# Patient Record
Sex: FEMALE | Race: WHITE | Employment: FULL TIME | URBAN - METROPOLITAN AREA
[De-identification: names, ages, dates, MRNs, and addresses within clinical notes are randomized per-mention and may not be internally consistent; named-entity substitution may affect disease eponyms.]

---

## 2017-12-27 ENCOUNTER — OFFICE VISIT (OUTPATIENT)
Dept: OBGYN | Age: 47
End: 2017-12-27

## 2017-12-27 VITALS
DIASTOLIC BLOOD PRESSURE: 70 MMHG | HEIGHT: 71 IN | SYSTOLIC BLOOD PRESSURE: 120 MMHG | BODY MASS INDEX: 38.58 KG/M2 | WEIGHT: 275.6 LBS

## 2017-12-27 DIAGNOSIS — Z12.39 BREAST CANCER SCREENING: ICD-10-CM

## 2017-12-27 DIAGNOSIS — Z01.419 ENCOUNTER FOR ANNUAL ROUTINE GYNECOLOGICAL EXAMINATION: Primary | ICD-10-CM

## 2017-12-27 LAB — PAP SMEAR: NORMAL

## 2017-12-27 PROCEDURE — 99396 PREV VISIT EST AGE 40-64: CPT | Performed by: OBSTETRICS & GYNECOLOGY

## 2017-12-27 RX ORDER — IBUPROFEN 600 MG/1
600 TABLET ORAL EVERY 6 HOURS PRN
Qty: 120 TABLET | Refills: 3 | Status: SHIPPED | OUTPATIENT
Start: 2017-12-27 | End: 2019-05-06 | Stop reason: SDUPTHER

## 2017-12-27 NOTE — PROGRESS NOTES
pending  MMG referral sent in   PT to monitor MMR sx and return to discuss treatment options as needed  Rx ibuprofen to pharmacy   Past medical, social and family history reviewed and updated in pt's chart. Routine health screenings and precautions discussed.

## 2018-01-05 DIAGNOSIS — Z01.419 ENCOUNTER FOR ANNUAL ROUTINE GYNECOLOGICAL EXAMINATION: ICD-10-CM

## 2018-01-06 ENCOUNTER — HOSPITAL ENCOUNTER (OUTPATIENT)
Dept: WOMENS IMAGING | Age: 48
Discharge: HOME | End: 2018-01-06
Payer: COMMERCIAL

## 2018-01-06 DIAGNOSIS — Z12.39 BREAST CANCER SCREENING: ICD-10-CM

## 2018-01-06 PROCEDURE — 77067 SCR MAMMO BI INCL CAD: CPT

## 2018-06-01 ENCOUNTER — OFFICE VISIT (OUTPATIENT)
Dept: PRIMARY CARE CLINIC | Age: 48
End: 2018-06-01
Payer: COMMERCIAL

## 2018-06-01 VITALS
SYSTOLIC BLOOD PRESSURE: 124 MMHG | BODY MASS INDEX: 40.18 KG/M2 | HEART RATE: 91 BPM | RESPIRATION RATE: 16 BRPM | WEIGHT: 287 LBS | TEMPERATURE: 98.4 F | DIASTOLIC BLOOD PRESSURE: 82 MMHG | OXYGEN SATURATION: 98 % | HEIGHT: 71 IN

## 2018-06-01 DIAGNOSIS — M54.2 NECK PAIN: Primary | ICD-10-CM

## 2018-06-01 PROCEDURE — 96372 THER/PROPH/DIAG INJ SC/IM: CPT | Performed by: NURSE PRACTITIONER

## 2018-06-01 PROCEDURE — 99213 OFFICE O/P EST LOW 20 MIN: CPT | Performed by: NURSE PRACTITIONER

## 2018-06-01 RX ORDER — PREDNISONE 20 MG/1
TABLET ORAL
COMMUNITY
Start: 2018-05-27 | End: 2018-10-08

## 2018-06-01 RX ORDER — KETOROLAC TROMETHAMINE 30 MG/ML
60 INJECTION, SOLUTION INTRAMUSCULAR; INTRAVENOUS ONCE
Status: COMPLETED | OUTPATIENT
Start: 2018-06-01 | End: 2018-06-01

## 2018-06-01 RX ORDER — CYCLOBENZAPRINE HCL 10 MG
10 TABLET ORAL 3 TIMES DAILY PRN
Qty: 9 TABLET | Refills: 0 | Status: SHIPPED | OUTPATIENT
Start: 2018-06-01 | End: 2018-06-04

## 2018-06-01 RX ORDER — CYCLOBENZAPRINE HCL 10 MG
TABLET ORAL
COMMUNITY
Start: 2018-05-27 | End: 2018-06-01 | Stop reason: SDUPTHER

## 2018-06-01 RX ORDER — PANTOPRAZOLE SODIUM 40 MG/1
TABLET, DELAYED RELEASE ORAL
Refills: 3 | COMMUNITY
Start: 2018-04-12 | End: 2018-10-08

## 2018-06-01 RX ADMIN — KETOROLAC TROMETHAMINE 60 MG: 30 INJECTION, SOLUTION INTRAMUSCULAR; INTRAVENOUS at 15:09

## 2018-06-01 ASSESSMENT — ENCOUNTER SYMPTOMS
COLOR CHANGE: 0
TROUBLE SWALLOWING: 0
SHORTNESS OF BREATH: 0

## 2018-06-04 ENCOUNTER — OFFICE VISIT (OUTPATIENT)
Dept: PRIMARY CARE CLINIC | Age: 48
End: 2018-06-04
Payer: COMMERCIAL

## 2018-06-04 VITALS
RESPIRATION RATE: 16 BRPM | BODY MASS INDEX: 39.76 KG/M2 | HEART RATE: 86 BPM | HEIGHT: 71 IN | OXYGEN SATURATION: 99 % | SYSTOLIC BLOOD PRESSURE: 134 MMHG | TEMPERATURE: 97.6 F | WEIGHT: 284 LBS | DIASTOLIC BLOOD PRESSURE: 90 MMHG

## 2018-06-04 DIAGNOSIS — I10 ESSENTIAL HYPERTENSION: ICD-10-CM

## 2018-06-04 DIAGNOSIS — M54.2 NECK PAIN ON RIGHT SIDE: Primary | ICD-10-CM

## 2018-06-04 PROCEDURE — G8417 CALC BMI ABV UP PARAM F/U: HCPCS | Performed by: INTERNAL MEDICINE

## 2018-06-04 PROCEDURE — 99214 OFFICE O/P EST MOD 30 MIN: CPT | Performed by: INTERNAL MEDICINE

## 2018-06-04 PROCEDURE — 1036F TOBACCO NON-USER: CPT | Performed by: INTERNAL MEDICINE

## 2018-06-04 PROCEDURE — G8427 DOCREV CUR MEDS BY ELIG CLIN: HCPCS | Performed by: INTERNAL MEDICINE

## 2018-06-04 RX ORDER — LOSARTAN POTASSIUM 50 MG/1
50 TABLET ORAL DAILY
Qty: 30 TABLET | Refills: 5 | Status: SHIPPED | OUTPATIENT
Start: 2018-06-04 | End: 2018-10-02 | Stop reason: SDUPTHER

## 2018-06-04 RX ORDER — CYCLOBENZAPRINE HCL 10 MG
10 TABLET ORAL 2 TIMES DAILY PRN
Qty: 60 TABLET | Refills: 2 | Status: SHIPPED | OUTPATIENT
Start: 2018-06-04 | End: 2018-10-08

## 2018-06-04 ASSESSMENT — PATIENT HEALTH QUESTIONNAIRE - PHQ9
2. FEELING DOWN, DEPRESSED OR HOPELESS: 0
SUM OF ALL RESPONSES TO PHQ QUESTIONS 1-9: 0
SUM OF ALL RESPONSES TO PHQ9 QUESTIONS 1 & 2: 0
1. LITTLE INTEREST OR PLEASURE IN DOING THINGS: 0

## 2018-06-07 ASSESSMENT — ENCOUNTER SYMPTOMS
CHOKING: 0
APNEA: 0
PHOTOPHOBIA: 0
BLOOD IN STOOL: 0
FACIAL SWELLING: 0
ABDOMINAL DISTENTION: 0

## 2018-09-28 DIAGNOSIS — T78.40XA ALLERGIC STATE, INITIAL ENCOUNTER: Primary | ICD-10-CM

## 2018-10-02 DIAGNOSIS — I10 ESSENTIAL HYPERTENSION: ICD-10-CM

## 2018-10-02 RX ORDER — LOSARTAN POTASSIUM 100 MG/1
100 TABLET ORAL DAILY
Qty: 90 TABLET | Refills: 0 | Status: SHIPPED | OUTPATIENT
Start: 2018-10-02 | End: 2018-10-03 | Stop reason: SDUPTHER

## 2018-10-03 DIAGNOSIS — I10 ESSENTIAL HYPERTENSION: ICD-10-CM

## 2018-10-03 RX ORDER — LOSARTAN POTASSIUM 100 MG/1
100 TABLET ORAL DAILY
Qty: 90 TABLET | Refills: 1 | Status: SHIPPED | OUTPATIENT
Start: 2018-10-03 | End: 2018-10-08

## 2018-10-08 ENCOUNTER — OFFICE VISIT (OUTPATIENT)
Dept: OBGYN CLINIC | Age: 48
End: 2018-10-08
Payer: COMMERCIAL

## 2018-10-08 VITALS
DIASTOLIC BLOOD PRESSURE: 92 MMHG | HEIGHT: 71 IN | BODY MASS INDEX: 39.06 KG/M2 | SYSTOLIC BLOOD PRESSURE: 138 MMHG | WEIGHT: 279 LBS

## 2018-10-08 DIAGNOSIS — Z01.419 WELL WOMAN EXAM WITH ROUTINE GYNECOLOGICAL EXAM: Primary | ICD-10-CM

## 2018-10-08 DIAGNOSIS — Z11.51 SCREENING FOR HPV (HUMAN PAPILLOMAVIRUS): ICD-10-CM

## 2018-10-08 DIAGNOSIS — Z12.39 BREAST CANCER SCREENING: ICD-10-CM

## 2018-10-08 PROCEDURE — G8484 FLU IMMUNIZE NO ADMIN: HCPCS | Performed by: OBSTETRICS & GYNECOLOGY

## 2018-10-08 PROCEDURE — 99396 PREV VISIT EST AGE 40-64: CPT | Performed by: OBSTETRICS & GYNECOLOGY

## 2018-10-08 RX ORDER — LEVOTHYROXINE SODIUM 137 MCG
TABLET ORAL
COMMUNITY
Start: 2018-08-03

## 2018-10-08 RX ORDER — PANTOPRAZOLE SODIUM 20 MG/1
40 TABLET, DELAYED RELEASE ORAL
COMMUNITY
Start: 2018-08-27 | End: 2022-10-26

## 2018-10-08 RX ORDER — LOSARTAN POTASSIUM 50 MG/1
TABLET ORAL
COMMUNITY
Start: 2018-09-07 | End: 2020-07-09

## 2018-10-08 NOTE — PROGRESS NOTES
SUBJECTIVE:   51 y.o.  female here for annual exam. Pt with regular menses but heavy menses and no complaints today. PT with some intermittent hot flushes. Pt's  with vasectomy. Review of Systems:  General ROS: negative  Psychological ROS: negative  ENT ROS: negative  Endocrine ROS: negative  Respiratory ROS: no cough, shortness of breath, or wheezing  Cardiovascular ROS: no chest pain or dyspnea on exertion  Gastrointestinal ROS: no abdominal pain, change in bowel habits, or black or bloody stools  Genito-Urinary ROS: no dysuria, trouble voiding, or hematuria  Musculoskeletal ROS: negative  Neurological ROS: no TIA or stroke symptoms  Dermatological ROS: negative    OBJECTIVE:   BP (!) 138/92   Ht 5' 11\" (1.803 m)   Wt 279 lb (126.6 kg)   LMP 2018   BMI 38.91 kg/m²     Physical Exam:  CONSTITUTIONAL: She appears well nourished and developed  NEUROLOGIC: Alert and oriented to time, place and person  NECK: no thyroidmegaly  BREASTS: Bilateral breasts without masses, lesions or nipple discharge  LUNGS: Clear to ascultation bilaterally  CVS: regular rate and rhythm  LYMPHATIC: No palpable lymph nodes  ABDOMEN: benign, soft, nontender, no masses. No liver or splenic organomegaly. No evidence of abdominal or inguinal hernia. No indication for occult blood testing  SKIN: normal texture and tone, no lesions  NEURO: normal tone, no hyperreflexia, 1+DTRs throughout    Pelvic Exam:   EFG: normal external genitalia  URETHRAL MEATUS: normal size, no diverticula   URETHRA: normal appearing without diverticula or lesions  BLADDER:  No masses or tenderness  VAGINA: normal rugae, no discharge   CERVIX: nulliparous, no lesions  UTERUS: uterus is normal size, shape, consistency and nontender   ADNEXA: normal adnexa in size, nontender and no masses. PERINEUM: normal appearing without lesions or masses  RECTUM: no hemorrhoids or rectal masses.      ASSESSMENT:   Routine gynecologic exam  Breast cancer

## 2018-10-12 DIAGNOSIS — Z11.51 SCREENING FOR HPV (HUMAN PAPILLOMAVIRUS): ICD-10-CM

## 2018-10-12 DIAGNOSIS — Z01.419 WELL WOMAN EXAM WITH ROUTINE GYNECOLOGICAL EXAM: ICD-10-CM

## 2018-12-26 ENCOUNTER — HOSPITAL ENCOUNTER (OUTPATIENT)
Dept: GENERAL RADIOLOGY | Age: 48
Discharge: HOME | End: 2018-12-28
Payer: COMMERCIAL

## 2018-12-26 DIAGNOSIS — S43.431A SUPERIOR GLENOID LABRUM LESION OF RIGHT SHOULDER, INITIAL ENCOUNTER: ICD-10-CM

## 2018-12-26 PROCEDURE — 73020 X-RAY EXAM OF SHOULDER: CPT

## 2019-03-14 ENCOUNTER — HOSPITAL ENCOUNTER (OUTPATIENT)
Dept: WOMENS IMAGING | Age: 49
Discharge: HOME | End: 2019-03-16
Payer: COMMERCIAL

## 2019-03-14 DIAGNOSIS — Z01.419 WELL WOMAN EXAM WITH ROUTINE GYNECOLOGICAL EXAM: ICD-10-CM

## 2019-03-14 DIAGNOSIS — Z12.39 BREAST CANCER SCREENING: ICD-10-CM

## 2019-03-14 PROCEDURE — 77067 SCR MAMMO BI INCL CAD: CPT

## 2019-04-04 DIAGNOSIS — I10 ESSENTIAL HYPERTENSION: ICD-10-CM

## 2019-04-08 RX ORDER — LOSARTAN POTASSIUM 100 MG/1
100 TABLET ORAL DAILY
Qty: 90 TABLET | Refills: 0 | Status: SHIPPED | OUTPATIENT
Start: 2019-04-08 | End: 2019-04-11 | Stop reason: SDUPTHER

## 2019-04-08 NOTE — TELEPHONE ENCOUNTER
Patient was last seen on 6-4-18  Last Prescribed 9-7-18    Medication is pending  Please approve or deny this request

## 2019-04-11 DIAGNOSIS — I10 ESSENTIAL HYPERTENSION: ICD-10-CM

## 2019-04-24 RX ORDER — LOSARTAN POTASSIUM 100 MG/1
100 TABLET ORAL DAILY
Qty: 90 TABLET | Refills: 0 | Status: SHIPPED | OUTPATIENT
Start: 2019-04-24 | End: 2021-05-16 | Stop reason: SINTOL

## 2019-05-04 ENCOUNTER — HOSPITAL ENCOUNTER (OUTPATIENT)
Dept: GENERAL RADIOLOGY | Age: 49
Discharge: HOME | End: 2019-05-06
Payer: COMMERCIAL

## 2019-05-04 ENCOUNTER — OFFICE VISIT (OUTPATIENT)
Dept: FAMILY MEDICINE CLINIC | Age: 49
End: 2019-05-04
Payer: COMMERCIAL

## 2019-05-04 VITALS
SYSTOLIC BLOOD PRESSURE: 130 MMHG | HEART RATE: 86 BPM | BODY MASS INDEX: 39.62 KG/M2 | DIASTOLIC BLOOD PRESSURE: 84 MMHG | HEIGHT: 71 IN | WEIGHT: 283 LBS | TEMPERATURE: 97.1 F | RESPIRATION RATE: 15 BRPM | OXYGEN SATURATION: 100 %

## 2019-05-04 DIAGNOSIS — M25.552 LATERAL PAIN OF LEFT HIP: Primary | ICD-10-CM

## 2019-05-04 DIAGNOSIS — M25.552 LATERAL PAIN OF LEFT HIP: ICD-10-CM

## 2019-05-04 PROCEDURE — 1036F TOBACCO NON-USER: CPT | Performed by: NURSE PRACTITIONER

## 2019-05-04 PROCEDURE — 73502 X-RAY EXAM HIP UNI 2-3 VIEWS: CPT

## 2019-05-04 PROCEDURE — 99213 OFFICE O/P EST LOW 20 MIN: CPT | Performed by: NURSE PRACTITIONER

## 2019-05-04 PROCEDURE — G8417 CALC BMI ABV UP PARAM F/U: HCPCS | Performed by: NURSE PRACTITIONER

## 2019-05-04 PROCEDURE — G8427 DOCREV CUR MEDS BY ELIG CLIN: HCPCS | Performed by: NURSE PRACTITIONER

## 2019-05-04 ASSESSMENT — ENCOUNTER SYMPTOMS
RHINORRHEA: 0
WHEEZING: 0
VOMITING: 0
COUGH: 0
DIARRHEA: 0
SORE THROAT: 0
SHORTNESS OF BREATH: 0
ABDOMINAL PAIN: 0
NAUSEA: 0

## 2019-05-04 ASSESSMENT — PATIENT HEALTH QUESTIONNAIRE - PHQ9
2. FEELING DOWN, DEPRESSED OR HOPELESS: 0
1. LITTLE INTEREST OR PLEASURE IN DOING THINGS: 0
SUM OF ALL RESPONSES TO PHQ QUESTIONS 1-9: 0
SUM OF ALL RESPONSES TO PHQ QUESTIONS 1-9: 0
SUM OF ALL RESPONSES TO PHQ9 QUESTIONS 1 & 2: 0

## 2019-05-04 NOTE — PROGRESS NOTES
Subjective  Laisha Knutson, 51 y.o. female presents today with:  Chief Complaint   Patient presents with    Hip Pain     left hip pain ,[id number], dull ache but gets sharp       Hip Pain    Incident onset: x1 month ago. The incident occurred at home. The pain is present in the left hip. The quality of the pain is described as stabbing and aching. The pain is at a severity of 8/10. The pain is severe. The pain has been constant since onset. Pertinent negatives include no numbness or tingling. She reports no foreign bodies present. The symptoms are aggravated by weight bearing. She has tried NSAIDs for the symptoms. The treatment provided mild relief. Review of Systems   Constitutional: Positive for activity change. Negative for chills, fatigue and fever. HENT: Negative for congestion, rhinorrhea and sore throat. Respiratory: Negative for cough, shortness of breath and wheezing. Gastrointestinal: Negative for abdominal pain, diarrhea, nausea and vomiting. Musculoskeletal: Positive for arthralgias (left hip) and gait problem. Negative for myalgias and neck pain. Neurological: Negative for dizziness, tingling, weakness, light-headedness, numbness and headaches.        Past Medical History:   Diagnosis Date    Hypothyroidism     Sleep apnea 03/2016    dr Perri Velasco, mild, c pap    Unspecified vitamin D deficiency      Past Surgical History:   Procedure Laterality Date    APPENDECTOMY      CARPAL TUNNEL RELEASE      CHOLECYSTECTOMY      DILATION AND CURETTAGE OF UTERUS       Social History     Socioeconomic History    Marital status:      Spouse name: Not on file    Number of children: Not on file    Years of education: Not on file    Highest education level: Not on file   Occupational History    Not on file   Social Needs    Financial resource strain: Not on file    Food insecurity:     Worry: Not on file     Inability: Not on file    Transportation needs:     Medical: Not on file     Non-medical: Not on file   Tobacco Use    Smoking status: Never Smoker    Smokeless tobacco: Never Used   Substance and Sexual Activity    Alcohol use: No     Comment: occasional/rare    Drug use: No    Sexual activity: Yes   Lifestyle    Physical activity:     Days per week: Not on file     Minutes per session: Not on file    Stress: Not on file   Relationships    Social connections:     Talks on phone: Not on file     Gets together: Not on file     Attends Taoist service: Not on file     Active member of club or organization: Not on file     Attends meetings of clubs or organizations: Not on file     Relationship status: Not on file    Intimate partner violence:     Fear of current or ex partner: Not on file     Emotionally abused: Not on file     Physically abused: Not on file     Forced sexual activity: Not on file   Other Topics Concern    Not on file   Social History Narrative    Not on file     No family history on file. Allergies   Allergen Reactions    Penicillin G Anaphylaxis    Codeine Other (See Comments)    Demerol     Meperidine Other (See Comments)     vomitting    Penicillins Other (See Comments)     Current Outpatient Medications   Medication Sig Dispense Refill    losartan (COZAAR) 100 MG tablet Take 1 tablet by mouth daily 90 tablet 0    SYNTHROID 137 MCG tablet       ibuprofen (ADVIL;MOTRIN) 600 MG tablet Take 1 tablet by mouth every 6 hours as needed for Pain 120 tablet 3    pantoprazole (PROTONIX) 20 MG tablet Take 40 mg by mouth      losartan (COZAAR) 50 MG tablet       vitamin D (ERGOCALCIFEROL) 94077 UNITS CAPS capsule Take 50,000 Units by mouth every 14 days. No current facility-administered medications for this visit. PMH, Surgical Hx, Family Hx, and Social Hx reviewed and updated. Health Maintenance reviewed.     Objective  Vitals:    05/04/19 1135   BP: 130/84   Site: Left Upper Arm   Position: Sitting   Cuff Size: Large Adult   Pulse: 86 Resp: 15   Temp: 97.1 °F (36.2 °C)   TempSrc: Temporal   SpO2: 100%   Weight: 283 lb (128.4 kg)   Height: 5' 11\" (1.803 m)     BP Readings from Last 3 Encounters:   05/04/19 130/84   10/08/18 (!) 138/92   06/04/18 (!) 134/90     Wt Readings from Last 3 Encounters:   05/04/19 283 lb (128.4 kg)   10/08/18 279 lb (126.6 kg)   06/04/18 284 lb (128.8 kg)     Physical Exam   Constitutional: She is oriented to person, place, and time. She appears well-developed and well-nourished. She is active. Eyes: Pupils are equal, round, and reactive to light. Conjunctivae and EOM are normal.   Neck: Full passive range of motion without pain. No muscular tenderness present. Cardiovascular: Normal rate, regular rhythm, S1 normal, S2 normal, normal heart sounds and intact distal pulses. Pulmonary/Chest: Effort normal and breath sounds normal. No respiratory distress. She has no wheezes. She has no rhonchi. She has no rales. Musculoskeletal:        Legs:  Neurological: She is alert and oriented to person, place, and time. She has normal strength and normal reflexes. Skin: Skin is warm, dry and intact. No rash noted. Psychiatric: She has a normal mood and affect. Her speech is normal and behavior is normal.     Assessment & Plan    Diagnosis Orders   1. Lateral pain of left hip  XR HIP LEFT (2-3 VIEWS)     Orders Placed This Encounter   Procedures    XR HIP LEFT (2-3 VIEWS)     Standing Status:   Future     Number of Occurrences:   1     Standing Expiration Date:   5/3/2020     No orders of the defined types were placed in this encounter. There are no discontinued medications. Return in about 2 weeks (around 5/18/2019) for follow up with PCP. Reviewed with the patient: current clinical status,medications, activities and diet.      Motrin as needed for pain  Follow up with PCP for further management    Side effects, adverse effects of themedication prescribed today, as well as treatment plan/ rationale and result

## 2019-05-06 NOTE — TELEPHONE ENCOUNTER
Sonu Talmaantes requesting refill Please approve or deny refill request. Medication pended. Last OV: 10/2/18  Last RX: 12/27/1    IS THIS A CONTROLLED MEDICATION: No      [facility] [address] [phone] - F [address] [address]  [facility] [place] [address]  Phone: [phone] Fax: [phone]    [facility] [address] [phone] [facility] [phone]  [address] [place] [id number]  Phone: [phone] Fax: [phone]      Patient was advised to check with their pharmacy within 24-48 hours:  no    Next Visit Date:  No future appointments.     Requested Prescriptions     Pending Prescriptions Disp Refills    ibuprofen (ADVIL;MOTRIN) 600 MG tablet [Pharmacy Med Name: IBUPROFEN  600MG  TAB] 360 tablet      Sig: TAKE 1 TABLET BY MOUTH  EVERY 6 HOURS AS NEEDED FOR PAIN

## 2019-05-07 RX ORDER — IBUPROFEN 600 MG/1
600 TABLET ORAL EVERY 6 HOURS PRN
Qty: 360 TABLET | Refills: 2 | Status: SHIPPED | OUTPATIENT
Start: 2019-05-07 | End: 2020-07-09 | Stop reason: SDUPTHER

## 2019-07-10 RX ORDER — LOSARTAN POTASSIUM AND HYDROCHLOROTHIAZIDE 25; 100 MG/1; MG/1
1 TABLET ORAL DAILY
Qty: 90 TABLET | Refills: 0 | Status: SHIPPED | OUTPATIENT
Start: 2019-07-10 | End: 2019-09-26 | Stop reason: SDUPTHER

## 2019-09-26 DIAGNOSIS — I10 ESSENTIAL HYPERTENSION: ICD-10-CM

## 2019-09-26 RX ORDER — LOSARTAN POTASSIUM AND HYDROCHLOROTHIAZIDE 25; 100 MG/1; MG/1
1 TABLET ORAL DAILY
Qty: 30 TABLET | Refills: 0 | Status: SHIPPED | OUTPATIENT
Start: 2019-09-26 | End: 2020-07-09

## 2019-10-21 ENCOUNTER — HOSPITAL ENCOUNTER (OUTPATIENT)
Dept: ORTHOPEDIC SURGERY | Age: 49
Discharge: HOME | End: 2019-10-23
Payer: COMMERCIAL

## 2019-10-21 DIAGNOSIS — M18.9 OSTEOARTHRITIS OF CARPOMETACARPAL JOINT OF THUMB, UNSPECIFIED LATERALITY, UNSPECIFIED OSTEOARTHRITIS TYPE: ICD-10-CM

## 2019-10-21 PROCEDURE — 73140 X-RAY EXAM OF FINGER(S): CPT

## 2020-07-09 ENCOUNTER — HOSPITAL ENCOUNTER (OUTPATIENT)
Dept: WOMENS IMAGING | Age: 50
Discharge: HOME | End: 2020-07-11
Payer: COMMERCIAL

## 2020-07-09 ENCOUNTER — OFFICE VISIT (OUTPATIENT)
Dept: OBGYN CLINIC | Age: 50
End: 2020-07-09
Payer: COMMERCIAL

## 2020-07-09 VITALS
DIASTOLIC BLOOD PRESSURE: 82 MMHG | HEIGHT: 70 IN | BODY MASS INDEX: 39.51 KG/M2 | WEIGHT: 276 LBS | SYSTOLIC BLOOD PRESSURE: 114 MMHG

## 2020-07-09 PROCEDURE — 99396 PREV VISIT EST AGE 40-64: CPT | Performed by: OBSTETRICS & GYNECOLOGY

## 2020-07-09 PROCEDURE — 77063 BREAST TOMOSYNTHESIS BI: CPT

## 2020-07-09 RX ORDER — HYDROCHLOROTHIAZIDE 25 MG/1
25 TABLET ORAL DAILY
COMMUNITY
Start: 2020-01-27 | End: 2021-04-13 | Stop reason: SDUPTHER

## 2020-07-09 RX ORDER — AMLODIPINE BESYLATE 5 MG/1
5 TABLET ORAL DAILY
COMMUNITY
Start: 2019-12-12 | End: 2021-12-11 | Stop reason: SDUPTHER

## 2020-07-09 RX ORDER — PSYLLIUM HUSK 0.4 G
CAPSULE ORAL
COMMUNITY
Start: 2019-07-01 | End: 2021-04-13

## 2020-07-09 RX ORDER — IBUPROFEN 600 MG/1
600 TABLET ORAL EVERY 6 HOURS PRN
Qty: 60 TABLET | Refills: 4 | Status: SHIPPED | OUTPATIENT
Start: 2020-07-09 | End: 2021-05-11 | Stop reason: ALTCHOICE

## 2020-07-09 ASSESSMENT — PATIENT HEALTH QUESTIONNAIRE - PHQ9
SUM OF ALL RESPONSES TO PHQ QUESTIONS 1-9: 0
2. FEELING DOWN, DEPRESSED OR HOPELESS: 0
SUM OF ALL RESPONSES TO PHQ9 QUESTIONS 1 & 2: 0
1. LITTLE INTEREST OR PLEASURE IN DOING THINGS: 0
SUM OF ALL RESPONSES TO PHQ QUESTIONS 1-9: 0

## 2020-07-09 NOTE — PROGRESS NOTES
SUBJECTIVE:   47 y.o.  female here for annual exam. Pt with regular menses and no complaints today. Pt's partner with vasectomy. Review of Systems:  General ROS: negative  Psychological ROS: negative  ENT ROS: negative  Endocrine ROS: negative  Respiratory ROS: no cough, shortness of breath, or wheezing  Cardiovascular ROS: no chest pain or dyspnea on exertion  Gastrointestinal ROS: no abdominal pain, change in bowel habits, or black or bloody stools  Genito-Urinary ROS: no dysuria, trouble voiding, or hematuria  Musculoskeletal ROS: negative  Neurological ROS: no TIA or stroke symptoms  Dermatological ROS: negative    OBJECTIVE:   Ht 5' 10\" (1.778 m)   Wt 276 lb (125.2 kg)   LMP 2020   BMI 39.60 kg/m²     Physical Exam:  CONSTITUTIONAL: She appears well nourished and developed   NEUROLOGIC: Alert and oriented to time, place and person  NECK: no thyroidmegaly  BREASTS: Bilateral breasts without masses, lesions or nipple discharge  LUNGS: Clear to ascultation bilaterally  CVS: regular rate and rhythm  LYMPHATIC: No palpable lymph nodes  ABDOMEN: benign, soft, nontender, no masses. No liver or splenic organomegaly. No evidence of abdominal or inguinal hernia. No indication for occult blood testing  SKIN: normal texture and tone, no lesions  NEURO: normal tone, no hyperreflexia, 1+DTRs throughout    Pelvic Exam:   EFG: normal external genitalia  URETHRAL MEATUS: normal size, no diverticula   URETHRA: normal appearing without diverticula or lesions  BLADDER:  No masses or tenderness  VAGINA: normal rugae, no discharge   CERVIX: parous, no lesions  UTERUS: uterus is normal size, shape, consistency and nontender   ADNEXA: normal adnexa in size, nontender and no masses. PERINEUM: normal appearing without lesions or masses  RECTUM: no hemorrhoids or rectal masses.      ASSESSMENT:   Routine gynecologic exam  Breast cancer screening    PLAN:   Pap with hpv pending  MMG referral sent in   Past medical, social and family history reviewed and updated in pt's chart. Routine health screenings and precautions discussed.

## 2021-04-13 ENCOUNTER — OFFICE VISIT (OUTPATIENT)
Dept: PRIMARY CARE CLINIC | Age: 51
End: 2021-04-13
Payer: COMMERCIAL

## 2021-04-13 VITALS
OXYGEN SATURATION: 99 % | HEART RATE: 73 BPM | HEIGHT: 71 IN | SYSTOLIC BLOOD PRESSURE: 122 MMHG | WEIGHT: 260.4 LBS | RESPIRATION RATE: 18 BRPM | BODY MASS INDEX: 36.46 KG/M2 | DIASTOLIC BLOOD PRESSURE: 86 MMHG

## 2021-04-13 DIAGNOSIS — Z00.00 PREVENTATIVE HEALTH CARE: ICD-10-CM

## 2021-04-13 DIAGNOSIS — M25.561 ANTERIOR KNEE PAIN, RIGHT: Primary | ICD-10-CM

## 2021-04-13 DIAGNOSIS — I15.9 SECONDARY HYPERTENSION: ICD-10-CM

## 2021-04-13 LAB
ALBUMIN SERPL-MCNC: 3.7 G/DL (ref 3.5–4.6)
ALP BLD-CCNC: 58 U/L (ref 40–130)
ALT SERPL-CCNC: 15 U/L (ref 0–33)
ANION GAP SERPL CALCULATED.3IONS-SCNC: 11 MEQ/L (ref 9–15)
AST SERPL-CCNC: 13 U/L (ref 0–35)
BASOPHILS ABSOLUTE: 0.1 K/UL (ref 0–0.2)
BASOPHILS RELATIVE PERCENT: 0.9 %
BILIRUB SERPL-MCNC: 0.8 MG/DL (ref 0.2–0.7)
BUN BLDV-MCNC: 10 MG/DL (ref 6–20)
CALCIUM SERPL-MCNC: 8.9 MG/DL (ref 8.5–9.9)
CHLORIDE BLD-SCNC: 103 MEQ/L (ref 95–107)
CHOLESTEROL, TOTAL: 161 MG/DL (ref 0–199)
CO2: 24 MEQ/L (ref 20–31)
CREAT SERPL-MCNC: 0.92 MG/DL (ref 0.5–0.9)
EOSINOPHILS ABSOLUTE: 0.2 K/UL (ref 0–0.7)
EOSINOPHILS RELATIVE PERCENT: 2.1 %
GFR AFRICAN AMERICAN: >60
GFR NON-AFRICAN AMERICAN: >60
GLOBULIN: 2.9 G/DL (ref 2.3–3.5)
GLUCOSE BLD-MCNC: 97 MG/DL (ref 70–99)
HBA1C MFR BLD: 5.4 % (ref 4.8–5.9)
HCT VFR BLD CALC: 40.1 % (ref 37–47)
HDLC SERPL-MCNC: 45 MG/DL (ref 40–59)
HEMOGLOBIN: 13.3 G/DL (ref 12–16)
HEPATITIS C ANTIBODY INTERPRETATION: NORMAL
LDL CHOLESTEROL CALCULATED: 99 MG/DL (ref 0–129)
LYMPHOCYTES ABSOLUTE: 2.5 K/UL (ref 1–4.8)
LYMPHOCYTES RELATIVE PERCENT: 28.9 %
MCH RBC QN AUTO: 28.2 PG (ref 27–31.3)
MCHC RBC AUTO-ENTMCNC: 33.3 % (ref 33–37)
MCV RBC AUTO: 84.9 FL (ref 82–100)
MONOCYTES ABSOLUTE: 0.5 K/UL (ref 0.2–0.8)
MONOCYTES RELATIVE PERCENT: 6.2 %
NEUTROPHILS ABSOLUTE: 5.3 K/UL (ref 1.4–6.5)
NEUTROPHILS RELATIVE PERCENT: 61.9 %
PDW BLD-RTO: 14.5 % (ref 11.5–14.5)
PLATELET # BLD: 299 K/UL (ref 130–400)
POTASSIUM SERPL-SCNC: 3.8 MEQ/L (ref 3.4–4.9)
RBC # BLD: 4.72 M/UL (ref 4.2–5.4)
SODIUM BLD-SCNC: 138 MEQ/L (ref 135–144)
TOTAL PROTEIN: 6.6 G/DL (ref 6.3–8)
TRIGL SERPL-MCNC: 87 MG/DL (ref 0–150)
TSH REFLEX: 1.28 UIU/ML (ref 0.44–3.86)
VITAMIN D 25-HYDROXY: 34.6 NG/ML (ref 30–100)
WBC # BLD: 8.5 K/UL (ref 4.8–10.8)

## 2021-04-13 PROCEDURE — 3017F COLORECTAL CA SCREEN DOC REV: CPT | Performed by: INTERNAL MEDICINE

## 2021-04-13 PROCEDURE — G8427 DOCREV CUR MEDS BY ELIG CLIN: HCPCS | Performed by: INTERNAL MEDICINE

## 2021-04-13 PROCEDURE — G8417 CALC BMI ABV UP PARAM F/U: HCPCS | Performed by: INTERNAL MEDICINE

## 2021-04-13 PROCEDURE — 1036F TOBACCO NON-USER: CPT | Performed by: INTERNAL MEDICINE

## 2021-04-13 PROCEDURE — 99214 OFFICE O/P EST MOD 30 MIN: CPT | Performed by: INTERNAL MEDICINE

## 2021-04-13 RX ORDER — HYDROCHLOROTHIAZIDE 25 MG/1
12.5 TABLET ORAL DAILY
Qty: 45 TABLET | Refills: 3 | Status: SHIPPED | OUTPATIENT
Start: 2021-04-13 | End: 2021-05-16 | Stop reason: SINTOL

## 2021-04-13 SDOH — ECONOMIC STABILITY: TRANSPORTATION INSECURITY
IN THE PAST 12 MONTHS, HAS LACK OF TRANSPORTATION KEPT YOU FROM MEETINGS, WORK, OR FROM GETTING THINGS NEEDED FOR DAILY LIVING?: NO

## 2021-04-13 SDOH — ECONOMIC STABILITY: INCOME INSECURITY: HOW HARD IS IT FOR YOU TO PAY FOR THE VERY BASICS LIKE FOOD, HOUSING, MEDICAL CARE, AND HEATING?: NOT VERY HARD

## 2021-04-13 ASSESSMENT — ENCOUNTER SYMPTOMS
DIARRHEA: 0
COUGH: 0
VOMITING: 0
NAUSEA: 0
ABDOMINAL PAIN: 0
SHORTNESS OF BREATH: 0
WHEEZING: 0

## 2021-04-13 ASSESSMENT — PATIENT HEALTH QUESTIONNAIRE - PHQ9
1. LITTLE INTEREST OR PLEASURE IN DOING THINGS: 0
2. FEELING DOWN, DEPRESSED OR HOPELESS: 0

## 2021-04-13 NOTE — PROGRESS NOTES
Subjective:      Patient ID: Scarlett Reese is a 45 y.o. female    New to provider   HPI  Pt is new to provider. Wants to switch from Dr. Fern Mahajan. Hx hypertension, INNA, hypothyroidism   Meds: losartan, amlodipine, HCTZ, lopressor, Synthroid. Last colonoscopy: hemorrhoid and divertoculosis in 2018. Repeat in 2025  Last pap: negative result in 2020    Last mammogram: negative in 2020  ASCVD score: 1.6%    CC: right knee pain x 3 weeks   Pain is in front of the right knee, sharp, rated 6/10, radiating down the leg, relieved with ibuprofen, worse with climbing stairs. Assoc swelling, no fever or chills. Pain started after she tried getting out of bed ans heard a \"pop\". No leg weakness, buckling or locking. Pain is improvng since.     Past Medical History:   Diagnosis Date    Hypothyroidism     Secondary hypertension 4/13/2021    Sleep apnea 03/2016    dr Adlai Avendano, mild, c pap    Unspecified vitamin D deficiency      Past Surgical History:   Procedure Laterality Date    APPENDECTOMY      CARPAL TUNNEL RELEASE      CHOLECYSTECTOMY      DILATION AND CURETTAGE OF UTERUS       Social History     Socioeconomic History    Marital status:      Spouse name: Not on file    Number of children: Not on file    Years of education: Not on file    Highest education level: Not on file   Occupational History    Not on file   Social Needs    Financial resource strain: Not very hard    Food insecurity     Worry: Never true     Inability: Never true    Transportation needs     Medical: No     Non-medical: No   Tobacco Use    Smoking status: Never Smoker    Smokeless tobacco: Never Used   Substance and Sexual Activity    Alcohol use: No     Comment: occasional/rare    Drug use: No    Sexual activity: Yes   Lifestyle    Physical activity     Days per week: Not on file     Minutes per session: Not on file    Stress: Not on file   Relationships    Social connections     Talks on phone: Not on file     Gets /86 (Site: Left Upper Arm, Position: Sitting, Cuff Size: Large Adult)   Pulse 73   Resp 18   Ht 5' 11\" (1.803 m)   Wt 260 lb 6.4 oz (118.1 kg)   LMP 03/11/2021 (Approximate)   SpO2 99%   BMI 36.32 kg/m²     Physical Exam  Constitutional:       General: She is not in acute distress. Appearance: She is not diaphoretic. Cardiovascular:      Rate and Rhythm: Normal rate and regular rhythm. Pulses: Normal pulses. Heart sounds: Normal heart sounds, S1 normal and S2 normal. No murmur. Pulmonary:      Effort: Pulmonary effort is normal. No respiratory distress. Breath sounds: Normal breath sounds. No wheezing or rales. Chest:      Chest wall: No tenderness. Abdominal:      General: Bowel sounds are normal.      Tenderness: There is no abdominal tenderness. Musculoskeletal:      Comments: Right anterolateral knee swelling,   No knee erythema or rash, no TTP  ROM normal in flexion and extension   Anterior and posterior drawer negative, varus and valgus negative. Ayde and Lachman negative   Neurological:      Mental Status: She is alert. Assessment:       Diagnosis Orders   1. Anterior knee pain, right  XR KNEE RIGHT (3 VIEWS)   2. Secondary hypertension Controlled hydroCHLOROthiazide (HYDRODIURIL) 25 MG tablet   3.  Preventative health care  CBC With Auto Differential    Comprehensive Metabolic Panel    TSH with Reflex    Hemoglobin A1C    Lipid Panel    Vitamin D 25 Hydroxy    Hepatitis C Antibody     Plan:      Orders Placed This Encounter   Procedures    XR KNEE RIGHT (3 VIEWS)     Standing Status:   Future     Number of Occurrences:   1     Standing Expiration Date:   4/13/2022    CBC With Auto Differential     Standing Status:   Future     Number of Occurrences:   1     Standing Expiration Date:   4/13/2022    Comprehensive Metabolic Panel     Standing Status:   Future     Number of Occurrences:   1     Standing Expiration Date:   4/13/2022    TSH with Reflex Standing Status:   Future     Number of Occurrences:   1     Standing Expiration Date:   4/13/2022    Hemoglobin A1C     Standing Status:   Future     Number of Occurrences:   1     Standing Expiration Date:   4/13/2022    Lipid Panel     Standing Status:   Future     Number of Occurrences:   1     Standing Expiration Date:   4/13/2022     Order Specific Question:   Is Patient Fasting?/# of Hours     Answer:   yes    Vitamin D 25 Hydroxy     Standing Status:   Future     Number of Occurrences:   1     Standing Expiration Date:   4/13/2022    Hepatitis C Antibody     Standing Status:   Future     Number of Occurrences:   1     Standing Expiration Date:   4/13/2022     Orders Placed This Encounter   Medications    hydroCHLOROthiazide (HYDRODIURIL) 25 MG tablet     Sig: Take 0.5 tablets by mouth daily     Dispense:  45 tablet     Refill:  3     Dc HCTZ 25mg daily   Knee rest and ibuprofen prn. Return in about 1 month (around 5/13/2021) for assessment of response to treatment.

## 2021-04-15 DIAGNOSIS — N17.9 AKI (ACUTE KIDNEY INJURY) (HCC): Primary | ICD-10-CM

## 2021-04-15 LAB — HIV 1,2 COMBO ANTIGEN/ANTIBODY: NEGATIVE

## 2021-05-11 ENCOUNTER — OFFICE VISIT (OUTPATIENT)
Dept: PRIMARY CARE CLINIC | Age: 51
End: 2021-05-11
Payer: COMMERCIAL

## 2021-05-11 VITALS
BODY MASS INDEX: 37.21 KG/M2 | OXYGEN SATURATION: 98 % | WEIGHT: 265.8 LBS | HEIGHT: 71 IN | SYSTOLIC BLOOD PRESSURE: 120 MMHG | DIASTOLIC BLOOD PRESSURE: 74 MMHG | RESPIRATION RATE: 16 BRPM | HEART RATE: 78 BPM

## 2021-05-11 DIAGNOSIS — M79.674 PAIN OF TOE OF RIGHT FOOT: Primary | ICD-10-CM

## 2021-05-11 DIAGNOSIS — M25.561 ANTERIOR KNEE PAIN, RIGHT: ICD-10-CM

## 2021-05-11 DIAGNOSIS — N17.9 AKI (ACUTE KIDNEY INJURY) (HCC): ICD-10-CM

## 2021-05-11 PROCEDURE — 99214 OFFICE O/P EST MOD 30 MIN: CPT | Performed by: INTERNAL MEDICINE

## 2021-05-11 PROCEDURE — 1036F TOBACCO NON-USER: CPT | Performed by: INTERNAL MEDICINE

## 2021-05-11 PROCEDURE — G8427 DOCREV CUR MEDS BY ELIG CLIN: HCPCS | Performed by: INTERNAL MEDICINE

## 2021-05-11 PROCEDURE — 3017F COLORECTAL CA SCREEN DOC REV: CPT | Performed by: INTERNAL MEDICINE

## 2021-05-11 PROCEDURE — G8417 CALC BMI ABV UP PARAM F/U: HCPCS | Performed by: INTERNAL MEDICINE

## 2021-05-11 ASSESSMENT — ENCOUNTER SYMPTOMS
COUGH: 0
ABDOMINAL PAIN: 0
NAUSEA: 0
DIARRHEA: 0
WHEEZING: 0
VOMITING: 0
SHORTNESS OF BREATH: 0

## 2021-05-11 NOTE — PROGRESS NOTES
Subjective:      Patient ID: Gee Yeung is a 45 y.o. female      Left knee pain f/u  Toes pain x 5 months  HPI  Pt presents for follow up regarding right knee pain, now resolved. XR negative. Right second toe pain x 5 months   Gradul onset of constant stabbing zap-like pain in the right second toe rated 6/10, nonradiating, worse with weight- bearing and toe movement. Assoc occasional swelling, no antecedent trauma. previous steroid injection from podiatry provided relief for about 4 months. Mild MELINA noted. Will increase fluid intake an avoid NSAIDs.    Past Medical History:   Diagnosis Date    Hypothyroidism     Secondary hypertension 4/13/2021    Sleep apnea 03/2016    dr Kelsey Mancia, mild, c pap    Unspecified vitamin D deficiency      Past Surgical History:   Procedure Laterality Date    APPENDECTOMY      CARPAL TUNNEL RELEASE      CHOLECYSTECTOMY      DILATION AND CURETTAGE OF UTERUS       Social History     Socioeconomic History    Marital status:      Spouse name: Not on file    Number of children: Not on file    Years of education: Not on file    Highest education level: Not on file   Occupational History    Not on file   Social Needs    Financial resource strain: Not very hard    Food insecurity     Worry: Never true     Inability: Never true    Transportation needs     Medical: No     Non-medical: No   Tobacco Use    Smoking status: Never Smoker    Smokeless tobacco: Never Used   Substance and Sexual Activity    Alcohol use: No     Comment: occasional/rare    Drug use: No    Sexual activity: Yes   Lifestyle    Physical activity     Days per week: Not on file     Minutes per session: Not on file    Stress: Not on file   Relationships    Social connections     Talks on phone: Not on file     Gets together: Not on file     Attends Confucianism service: Not on file     Active member of club or organization: Not on file     Attends meetings of clubs or organizations: Not on file     Relationship status: Not on file    Intimate partner violence     Fear of current or ex partner: Not on file     Emotionally abused: Not on file     Physically abused: Not on file     Forced sexual activity: Not on file   Other Topics Concern    Not on file   Social History Narrative    Not on file     History reviewed. No pertinent family history. Allergies:  Penicillin g, Codeine, Demerol, Meperidine, and Penicillins  Patient Active Problem List   Diagnosis    Vitamin D deficiency    Hypothyroidism    Neck pain    Secondary hypertension     Current Outpatient Medications on File Prior to Visit   Medication Sig Dispense Refill    hydroCHLOROthiazide (HYDRODIURIL) 25 MG tablet Take 0.5 tablets by mouth daily 45 tablet 3    metoprolol tartrate (LOPRESSOR) 25 MG tablet Take 25 mg by mouth 2 times daily      amLODIPine (NORVASC) 5 MG tablet Take 5 mg by mouth daily      Cholecalciferol 50 MCG (2000 UT) CAPS Take 2,000 Units by mouth      losartan (COZAAR) 100 MG tablet Take 1 tablet by mouth daily 90 tablet 0    pantoprazole (PROTONIX) 20 MG tablet Take 40 mg by mouth      SYNTHROID 137 MCG tablet        No current facility-administered medications on file prior to visit. Review of Systems   Constitutional: Negative for chills, diaphoresis, fatigue and fever. HENT: Negative for congestion, ear discharge and ear pain. Respiratory: Negative for cough, shortness of breath and wheezing. Cardiovascular: Negative for chest pain. Gastrointestinal: Negative for abdominal pain, diarrhea, nausea and vomiting. Endocrine: Negative for cold intolerance and heat intolerance. Genitourinary: Negative for dysuria and frequency. Musculoskeletal: Positive for arthralgias and joint swelling. Neurological: Negative for dizziness and light-headedness.      Objective:   /74 (Site: Left Upper Arm, Position: Sitting, Cuff Size: Large Adult)   Pulse 78   Resp 16   Ht 5' 11\" (1.803 m) Wt 265 lb 12.8 oz (120.6 kg)   LMP 04/17/2021 (Approximate)   SpO2 98%   BMI 37.07 kg/m²     Physical Exam  Constitutional:       General: She is not in acute distress. Appearance: She is not diaphoretic. Cardiovascular:      Rate and Rhythm: Normal rate and regular rhythm. Pulses: Normal pulses. Heart sounds: Normal heart sounds, S1 normal and S2 normal. No murmur. Pulmonary:      Effort: Pulmonary effort is normal. No respiratory distress. Breath sounds: Normal breath sounds. No wheezing or rales. Chest:      Chest wall: No tenderness. Abdominal:      General: Bowel sounds are normal.      Tenderness: There is no abdominal tenderness. Musculoskeletal:      Comments:   No foot or ankle erythema, swelling   DP and PT pulses palpable b/l  No ulcers or cuts interdigitally b/l  Marked TTP in the right second MTP joint, with ROM limited in plantarflexion   Neurological:      Mental Status: She is alert. Psychiatric:         Mood and Affect: Mood normal.         Thought Content: Thought content normal.       Assessment:       Diagnosis Orders   1. Pain of toe of right foot  XR FOOT RIGHT (MIN 3 VIEWS)    likely from OA   2. Anterior knee pain, right      resolved   3. MELINA (acute kidney injury) ([facility] [address]       Plan:      Orders Placed This Encounter   Procedures    XR FOOT RIGHT (MIN 3 VIEWS)     Standing Status:   Future     Standing Expiration Date:   5/11/2022     Order Specific Question:   Reason for exam:     Answer:   right second MTP joint pain     No orders of the defined types were placed in this encounter. OTC APAP   No follow-ups on file.

## 2021-05-13 DIAGNOSIS — M79.674 PAIN OF TOE OF RIGHT FOOT: Primary | ICD-10-CM

## 2021-05-13 DIAGNOSIS — M19.079 ARTHRITIS OF METATARSOPHALANGEAL (MTP) JOINT OF GREAT TOE: ICD-10-CM

## 2021-05-14 DIAGNOSIS — M19.079 ARTHRITIS OF METATARSOPHALANGEAL (MTP) JOINT OF GREAT TOE: ICD-10-CM

## 2021-05-14 DIAGNOSIS — M79.674 PAIN OF TOE OF RIGHT FOOT: ICD-10-CM

## 2021-05-14 DIAGNOSIS — N17.9 AKI (ACUTE KIDNEY INJURY) (HCC): ICD-10-CM

## 2021-05-14 LAB
ALBUMIN SERPL-MCNC: 3.8 G/DL (ref 3.5–4.6)
ALP BLD-CCNC: 70 U/L (ref 40–130)
ALT SERPL-CCNC: 12 U/L (ref 0–33)
ANION GAP SERPL CALCULATED.3IONS-SCNC: 11 MEQ/L (ref 9–15)
AST SERPL-CCNC: 16 U/L (ref 0–35)
BILIRUB SERPL-MCNC: 0.5 MG/DL (ref 0.2–0.7)
BUN BLDV-MCNC: 16 MG/DL (ref 6–20)
CALCIUM SERPL-MCNC: 9.2 MG/DL (ref 8.5–9.9)
CHLORIDE BLD-SCNC: 105 MEQ/L (ref 95–107)
CO2: 22 MEQ/L (ref 20–31)
CREAT SERPL-MCNC: 0.94 MG/DL (ref 0.5–0.9)
GFR AFRICAN AMERICAN: >60
GFR NON-AFRICAN AMERICAN: >60
GLOBULIN: 3 G/DL (ref 2.3–3.5)
GLUCOSE BLD-MCNC: 97 MG/DL (ref 70–99)
POTASSIUM SERPL-SCNC: 3.8 MEQ/L (ref 3.4–4.9)
SODIUM BLD-SCNC: 138 MEQ/L (ref 135–144)
TOTAL PROTEIN: 6.8 G/DL (ref 6.3–8)
URIC ACID, SERUM: 6.5 MG/DL (ref 2.4–5.7)

## 2021-05-16 DIAGNOSIS — N17.9 AKI (ACUTE KIDNEY INJURY) (HCC): Primary | ICD-10-CM

## 2021-05-16 DIAGNOSIS — I15.9 SECONDARY HYPERTENSION: ICD-10-CM

## 2021-05-16 RX ORDER — METOPROLOL TARTRATE 50 MG/1
50 TABLET, FILM COATED ORAL 2 TIMES DAILY
Qty: 120 TABLET | Refills: 5 | Status: SHIPPED | OUTPATIENT
Start: 2021-05-16 | End: 2022-01-19 | Stop reason: SDUPTHER

## 2021-05-18 DIAGNOSIS — E79.0 HYPERURICEMIA: Primary | ICD-10-CM

## 2021-05-18 RX ORDER — COLCHICINE 0.6 MG/1
0.6 TABLET ORAL DAILY
Qty: 60 TABLET | Refills: 3 | Status: SHIPPED | OUTPATIENT
Start: 2021-05-18 | End: 2021-10-25

## 2021-05-18 RX ORDER — ALLOPURINOL 100 MG/1
100 TABLET ORAL DAILY
Qty: 30 TABLET | Refills: 1 | Status: SHIPPED | OUTPATIENT
Start: 2021-05-18 | End: 2021-10-25

## 2021-10-25 ENCOUNTER — OFFICE VISIT (OUTPATIENT)
Dept: OBGYN CLINIC | Age: 51
End: 2021-10-25
Payer: COMMERCIAL

## 2021-10-25 VITALS
DIASTOLIC BLOOD PRESSURE: 84 MMHG | WEIGHT: 266 LBS | BODY MASS INDEX: 38.08 KG/M2 | HEIGHT: 70 IN | SYSTOLIC BLOOD PRESSURE: 124 MMHG

## 2021-10-25 DIAGNOSIS — Z01.419 ENCOUNTER FOR WELL WOMAN EXAM WITH ROUTINE GYNECOLOGICAL EXAM: Primary | ICD-10-CM

## 2021-10-25 DIAGNOSIS — Z12.31 SCREENING MAMMOGRAM, ENCOUNTER FOR: ICD-10-CM

## 2021-10-25 PROCEDURE — 99396 PREV VISIT EST AGE 40-64: CPT | Performed by: OBSTETRICS & GYNECOLOGY

## 2021-10-25 PROCEDURE — G8484 FLU IMMUNIZE NO ADMIN: HCPCS | Performed by: OBSTETRICS & GYNECOLOGY

## 2021-10-25 ASSESSMENT — ENCOUNTER SYMPTOMS
RESPIRATORY NEGATIVE: 1
RECTAL PAIN: 0
DIARRHEA: 0
VOMITING: 0
EYES NEGATIVE: 1
ABDOMINAL DISTENTION: 0
NAUSEA: 0
ANAL BLEEDING: 0
ALLERGIC/IMMUNOLOGIC NEGATIVE: 1
ABDOMINAL PAIN: 0
CONSTIPATION: 0
BLOOD IN STOOL: 0

## 2021-10-25 ASSESSMENT — VISUAL ACUITY: OU: 1

## 2021-10-25 NOTE — PROGRESS NOTES
The patient was asked if she would like a chaperone present for her intimate exam. She  Declined the chaperone.  Fariba Tenorio CMA ([facility])

## 2021-10-25 NOTE — PROGRESS NOTES
Subjective:      Patient ID: Haim Baer is a 45 y.o. female    Annual exam.  No PMB. No GYN complaints. Pap deferred ( negative 2020 )  and screening mammogram ordered. Monthly SBE encouraged. Screening colonoscopy recommended per routine. F/U annual exam or prn. Vitals:  /84   Ht 5' 10\" (1.778 m)   Wt 266 lb (120.7 kg)   LMP 10/19/2021   BMI 38.17 kg/m²   Past Medical History:   Diagnosis Date    Hypothyroidism     Secondary hypertension 4/13/2021    Sleep apnea 03/2016    dr Pita Rich, mild, c pap    Unspecified vitamin D deficiency      Past Surgical History:   Procedure Laterality Date    APPENDECTOMY      CARPAL TUNNEL RELEASE      CHOLECYSTECTOMY      DILATION AND CURETTAGE OF UTERUS       Allergies:  Penicillin g, Codeine, Demerol, Meperidine, and Penicillins  Current Outpatient Medications   Medication Sig Dispense Refill    metoprolol tartrate (LOPRESSOR) 50 MG tablet Take 1 tablet by mouth 2 times daily 120 tablet 5    amLODIPine (NORVASC) 5 MG tablet Take 5 mg by mouth daily      Cholecalciferol 50 MCG (2000 UT) CAPS Take 2,000 Units by mouth      SYNTHROID 137 MCG tablet       pantoprazole (PROTONIX) 20 MG tablet Take 40 mg by mouth       No current facility-administered medications for this visit.      Social History     Socioeconomic History    Marital status:      Spouse name: Not on file    Number of children: Not on file    Years of education: Not on file    Highest education level: Not on file   Occupational History    Not on file   Tobacco Use    Smoking status: Never Smoker    Smokeless tobacco: Never Used   Vaping Use    Vaping Use: Never used   Substance and Sexual Activity    Alcohol use: No     Comment: occasional/rare    Drug use: No    Sexual activity: Yes   Other Topics Concern    Not on file   Social History Narrative    Not on file     Social Determinants of Health     Financial Resource Strain: Low Risk     Difficulty of Paying Living Expenses: Not very hard   Food Insecurity: No Food Insecurity    Worried About Running Out of Food in the Last Year: Never true    Ran Out of Food in the Last Year: Never true   Transportation Needs: No Transportation Needs    Lack of Transportation (Medical): No    Lack of Transportation (Non-Medical): No   Physical Activity:     Days of Exercise per Week:     Minutes of Exercise per Session:    Stress:     Feeling of Stress :    Social Connections:     Frequency of Communication with Friends and Family:     Frequency of Social Gatherings with Friends and Family:     Attends Judaism Services:     Active Member of Clubs or Organizations:     Attends Club or Organization Meetings:     Marital Status:    Intimate Partner Violence:     Fear of Current or Ex-Partner:     Emotionally Abused:     Physically Abused:     Sexually Abused:      History reviewed. No pertinent family history. Review of Systems   Constitutional: Negative. Negative for activity change, appetite change, chills, diaphoresis, fatigue, fever and unexpected weight change. HENT: Negative. Eyes: Negative. Respiratory: Negative. Cardiovascular: Negative. Gastrointestinal: Negative for abdominal distention, abdominal pain, anal bleeding, blood in stool, constipation, diarrhea, nausea, rectal pain and vomiting. Endocrine: Negative. Genitourinary: Negative for decreased urine volume, difficulty urinating, dyspareunia, dysuria, enuresis, flank pain, frequency, genital sores, hematuria, menstrual problem, pelvic pain, urgency, vaginal bleeding, vaginal discharge and vaginal pain. Musculoskeletal: Negative. Skin: Negative. Allergic/Immunologic: Negative. Neurological: Negative. Hematological: Negative. Psychiatric/Behavioral: Negative. Objective:     Physical Exam  Constitutional:       Appearance: She is well-developed. HENT:      Head: Normocephalic.    Eyes:      General: Lids are normal. Vision grossly intact. Neck:      Thyroid: No thyromegaly. Cardiovascular:      Rate and Rhythm: Normal rate and regular rhythm. Heart sounds: Normal heart sounds. Pulmonary:      Effort: Pulmonary effort is normal. No respiratory distress. Breath sounds: Normal breath sounds. No wheezing or rales. Chest:      Chest wall: No tenderness. Breasts:         Right: Normal. No swelling, bleeding, inverted nipple, mass, nipple discharge, skin change or tenderness. Left: Normal. No swelling, bleeding, inverted nipple, mass, nipple discharge, skin change or tenderness. Abdominal:      General: There is no distension. Palpations: Abdomen is soft. There is no mass. Tenderness: There is no abdominal tenderness. There is no guarding or rebound. Hernia: No hernia is present. There is no hernia in the left inguinal area or right inguinal area. Genitourinary:     General: Normal vulva. Pubic Area: No rash. Labia:         Right: No rash, tenderness, lesion or injury. Left: No rash, tenderness, lesion or injury. Urethra: No prolapse, urethral swelling or urethral lesion. Vagina: Normal. No signs of injury and foreign body. No vaginal discharge, erythema, tenderness or bleeding. Cervix: No cervical motion tenderness, discharge or friability. Uterus: Not deviated, not enlarged, not fixed and not tender. Adnexa:         Right: No mass, tenderness or fullness. Left: No mass, tenderness or fullness. Rectum: Normal.   Musculoskeletal:         General: No tenderness. Normal range of motion. Cervical back: Normal range of motion and neck supple. Lymphadenopathy:      Cervical: No cervical adenopathy. Upper Body:      Right upper body: No supraclavicular or axillary adenopathy. Left upper body: No supraclavicular or axillary adenopathy. Lower Body: No right inguinal adenopathy.  No left inguinal adenopathy. Skin:     General: Skin is warm and dry. Coloration: Skin is not pale. Findings: No erythema or rash. Neurological:      Mental Status: She is alert and oriented to person, place, and time. Psychiatric:         Behavior: Behavior normal.         Thought Content: Thought content normal.         Judgment: Judgment normal.         Assessment:       Diagnosis Orders   1. Encounter for well woman exam with routine gynecological exam     2. Screening mammogram, encounter for  YESSICA DIGITAL SCREEN W OR WO CAD BILATERAL        Plan:      Medications placedthis encounter:  No orders of the defined types were placed in this encounter. Orders placedthis encounter:  Orders Placed This Encounter   Procedures    YESSICA DIGITAL SCREEN W OR WO CAD BILATERAL     Standing Status:   Future     Standing Expiration Date:   10/25/2022         Follow up:  Return in about 1 year (around 10/25/2022) for Annual.   Repeat Annual GYN exam every 1 year. Cervical Cytology exam starts age 24. If Negative Cytology;  follow-up screening per current guidelines. Mammograms yearly starting at age 36. Calcium and Vitamin D dosing reviewed ( age appropriate ). Colonoscopy and bone density screening discussed ( age appropriate ). Birth control and STD prevention discussed ( age appropriate ). Gardisil counseling completed for all patients ( age appropriate ). Routine health maintenance ( per PCP and guidelines ).       \

## 2021-12-08 DIAGNOSIS — Z00.00 PREVENTATIVE HEALTH CARE: Primary | ICD-10-CM

## 2021-12-11 DIAGNOSIS — I15.9 SECONDARY HYPERTENSION: Primary | ICD-10-CM

## 2021-12-11 RX ORDER — AMLODIPINE BESYLATE 5 MG/1
5 TABLET ORAL DAILY
Qty: 90 TABLET | Refills: 3 | Status: SHIPPED | OUTPATIENT
Start: 2021-12-11 | End: 2022-01-19 | Stop reason: SDUPTHER

## 2022-01-03 ENCOUNTER — HOSPITAL ENCOUNTER (OUTPATIENT)
Dept: WOMENS IMAGING | Age: 52
Discharge: HOME | End: 2022-01-05
Payer: COMMERCIAL

## 2022-01-03 VITALS — HEIGHT: 71 IN | BODY MASS INDEX: 37.1 KG/M2

## 2022-01-03 DIAGNOSIS — Z12.31 SCREENING MAMMOGRAM, ENCOUNTER FOR: ICD-10-CM

## 2022-01-03 PROCEDURE — 77067 SCR MAMMO BI INCL CAD: CPT

## 2022-01-18 DIAGNOSIS — I15.9 SECONDARY HYPERTENSION: ICD-10-CM

## 2022-01-18 NOTE — TELEPHONE ENCOUNTER
Requested Prescriptions     Pending Prescriptions Disp Refills    amLODIPine (NORVASC) 5 MG tablet 90 tablet 3     Sig: Take 1 tablet by mouth daily    metoprolol tartrate (LOPRESSOR) 50 MG tablet 120 tablet 5     Sig: Take 1 tablet by mouth 2 times daily       [facility]

## 2022-01-19 RX ORDER — METOPROLOL TARTRATE 50 MG/1
50 TABLET, FILM COATED ORAL 2 TIMES DAILY
Qty: 120 TABLET | Refills: 5 | Status: SHIPPED | OUTPATIENT
Start: 2022-01-19

## 2022-01-19 RX ORDER — AMLODIPINE BESYLATE 5 MG/1
5 TABLET ORAL DAILY
Qty: 90 TABLET | Refills: 3 | Status: SHIPPED | OUTPATIENT
Start: 2022-01-19

## 2022-07-21 ENCOUNTER — OFFICE VISIT (OUTPATIENT)
Dept: OBGYN CLINIC | Age: 52
End: 2022-07-21
Payer: COMMERCIAL

## 2022-07-21 VITALS
DIASTOLIC BLOOD PRESSURE: 82 MMHG | BODY MASS INDEX: 38.92 KG/M2 | SYSTOLIC BLOOD PRESSURE: 138 MMHG | WEIGHT: 278 LBS | HEIGHT: 71 IN

## 2022-07-21 DIAGNOSIS — N36.8 PERIURETHRAL CYST: Primary | ICD-10-CM

## 2022-07-21 PROCEDURE — 99213 OFFICE O/P EST LOW 20 MIN: CPT | Performed by: OBSTETRICS & GYNECOLOGY

## 2022-07-21 RX ORDER — SULFAMETHOXAZOLE AND TRIMETHOPRIM 800; 160 MG/1; MG/1
1 TABLET ORAL 2 TIMES DAILY
Qty: 14 TABLET | Refills: 0 | Status: SHIPPED | OUTPATIENT
Start: 2022-07-21 | End: 2022-07-28

## 2022-07-21 ASSESSMENT — ENCOUNTER SYMPTOMS
CONSTIPATION: 0
BLOOD IN STOOL: 0
NAUSEA: 0
RECTAL PAIN: 0
DIARRHEA: 0
ABDOMINAL PAIN: 0
ANAL BLEEDING: 0
VOMITING: 0
RESPIRATORY NEGATIVE: 1
ALLERGIC/IMMUNOLOGIC NEGATIVE: 1
EYES NEGATIVE: 1
ABDOMINAL DISTENTION: 0

## 2022-07-21 ASSESSMENT — PATIENT HEALTH QUESTIONNAIRE - PHQ9
SUM OF ALL RESPONSES TO PHQ9 QUESTIONS 1 & 2: 0
SUM OF ALL RESPONSES TO PHQ QUESTIONS 1-9: 0
2. FEELING DOWN, DEPRESSED OR HOPELESS: 0
1. LITTLE INTEREST OR PLEASURE IN DOING THINGS: 0
SUM OF ALL RESPONSES TO PHQ QUESTIONS 1-9: 0

## 2022-07-21 NOTE — PROGRESS NOTES
Patient here c/o kerry-urethral lump since Tuesday. Reviewed medical, surgical, social and family history. Also reviewed current medications and allergies. Patient states she noticed a lump in keryr-urethral area a few days ago. Has increased in size and now tender. Denies new sexual partners or abnormal discharge. No lesions. Exam with 2 cm left kerry-urethral cyst, mildly tender. Started on Bactrim DS bid x 7 days, warm compresses and epsom salt sitz baths. F/U 2 weeks. All questions answered. Vitals:  /82   Ht 5' 11\" (1.803 m)   Wt 278 lb (126.1 kg)   LMP 07/03/2022   BMI 38.77 kg/m²   Past Medical History:   Diagnosis Date    Hypothyroidism     Secondary hypertension 4/13/2021    Sleep apnea 03/2016    dr Leonid Lyman, mild, c pap    Unspecified vitamin D deficiency      Past Surgical History:   Procedure Laterality Date    APPENDECTOMY      CARPAL TUNNEL RELEASE      CHOLECYSTECTOMY      DILATION AND CURETTAGE OF UTERUS       Allergies:  Penicillin g, Codeine, Demerol, Meperidine, and Penicillins  Current Outpatient Medications   Medication Sig Dispense Refill    sulfamethoxazole-trimethoprim (BACTRIM DS;SEPTRA DS) 800-160 MG per tablet Take 1 tablet by mouth in the morning and 1 tablet before bedtime. Do all this for 7 days. 14 tablet 0    amLODIPine (NORVASC) 5 MG tablet Take 1 tablet by mouth daily 90 tablet 3    metoprolol tartrate (LOPRESSOR) 50 MG tablet Take 1 tablet by mouth 2 times daily 120 tablet 5    Cholecalciferol 50 MCG (2000 UT) CAPS Take 2,000 Units by mouth      SYNTHROID 137 MCG tablet       pantoprazole (PROTONIX) 20 MG tablet Take 40 mg by mouth       No current facility-administered medications for this visit.      Social History     Socioeconomic History    Marital status:      Spouse name: Not on file    Number of children: Not on file    Years of education: Not on file    Highest education level: Not on file   Occupational History    Not on file   Tobacco Use Smoking status: Never    Smokeless tobacco: Never   Vaping Use    Vaping Use: Never used   Substance and Sexual Activity    Alcohol use: No     Comment: occasional/rare    Drug use: No    Sexual activity: Yes     Partners: Male     Comment: vasectomy   Other Topics Concern    Not on file   Social History Narrative    Not on file     Social Determinants of Health     Financial Resource Strain: Not on file   Food Insecurity: Not on file   Transportation Needs: Not on file   Physical Activity: Not on file   Stress: Not on file   Social Connections: Not on file   Intimate Partner Violence: Not on file   Housing Stability: Not on file        Family History   Problem Relation Age of Onset    Breast Cancer Mother     Breast Cancer Sister        Review of Systems   Constitutional: Negative. Negative for activity change, appetite change, chills, diaphoresis, fatigue, fever and unexpected weight change. HENT: Negative. Eyes: Negative. Respiratory: Negative. Cardiovascular: Negative. Gastrointestinal:  Negative for abdominal distention, abdominal pain, anal bleeding, blood in stool, constipation, diarrhea, nausea, rectal pain and vomiting. Endocrine: Negative. Genitourinary:  Negative for decreased urine volume, difficulty urinating, dyspareunia, dysuria, enuresis, flank pain, frequency, genital sores, hematuria, menstrual problem, pelvic pain, urgency, vaginal bleeding, vaginal discharge and vaginal pain. Musculoskeletal: Negative. Skin: Negative. Allergic/Immunologic: Negative. Neurological: Negative. Hematological: Negative. Psychiatric/Behavioral: Negative. Objective:     Physical Exam  Constitutional:       General: She is not in acute distress. Appearance: She is well-developed. She is not diaphoretic. HENT:      Head: Normocephalic and atraumatic.    Eyes:      Conjunctiva/sclera: Conjunctivae normal.   Cardiovascular:      Rate and Rhythm: Normal rate and regular rhythm. Pulmonary:      Effort: Pulmonary effort is normal. No respiratory distress. Genitourinary:     Labia:         Right: No rash, tenderness, lesion or injury. Left: No rash, tenderness, lesion or injury. Vagina: Normal. No signs of injury and foreign body. No vaginal discharge, erythema, tenderness or bleeding. Comments: No abnormal discharge. No cervical or vaginal lesions. Normal external genitalia. Musculoskeletal:         General: No tenderness or deformity. Normal range of motion. Cervical back: Normal range of motion and neck supple. Skin:     General: Skin is warm and dry. Coloration: Skin is not pale. Neurological:      Mental Status: She is alert and oriented to person, place, and time. Motor: No abnormal muscle tone. Coordination: Coordination normal.   Psychiatric:         Behavior: Behavior normal.         Thought Content: Thought content normal.         Judgment: Judgment normal.       Assessment:          Diagnosis Orders   1. Periurethral cyst             Plan:      Medications placedthis encounter:  Orders Placed This Encounter   Medications    sulfamethoxazole-trimethoprim (BACTRIM DS;SEPTRA DS) 800-160 MG per tablet     Sig: Take 1 tablet by mouth in the morning and 1 tablet before bedtime. Do all this for 7 days. Dispense:  14 tablet     Refill:  0         Orders placedthis encounter:  No orders of the defined types were placed in this encounter. Follow up:  Return in about 2 weeks (around 8/4/2022) for GYN F/U.

## 2022-08-08 ENCOUNTER — OFFICE VISIT (OUTPATIENT)
Dept: OBGYN CLINIC | Age: 52
End: 2022-08-08
Payer: COMMERCIAL

## 2022-08-08 VITALS
HEIGHT: 71 IN | WEIGHT: 281 LBS | SYSTOLIC BLOOD PRESSURE: 122 MMHG | DIASTOLIC BLOOD PRESSURE: 82 MMHG | BODY MASS INDEX: 39.34 KG/M2

## 2022-08-08 DIAGNOSIS — N36.8 PERIURETHRAL CYST: Primary | ICD-10-CM

## 2022-08-08 PROCEDURE — 99213 OFFICE O/P EST LOW 20 MIN: CPT | Performed by: OBSTETRICS & GYNECOLOGY

## 2022-08-08 ASSESSMENT — ENCOUNTER SYMPTOMS
ABDOMINAL DISTENTION: 0
ABDOMINAL PAIN: 0
NAUSEA: 0
DIARRHEA: 0
BLOOD IN STOOL: 0
RESPIRATORY NEGATIVE: 1
ANAL BLEEDING: 0
ALLERGIC/IMMUNOLOGIC NEGATIVE: 1
VOMITING: 0
EYES NEGATIVE: 1
RECTAL PAIN: 0
CONSTIPATION: 0

## 2022-08-08 NOTE — PROGRESS NOTES
The patient was asked if she would like a chaperone present for her intimate exam. She  Declined the chaperone.  Isamar Morales CMA ([address] [place] [address])
pale.   Neurological:      Mental Status: She is alert and oriented to person, place, and time. Motor: No abnormal muscle tone. Coordination: Coordination normal.   Psychiatric:         Behavior: Behavior normal.         Thought Content: Thought content normal.         Judgment: Judgment normal.       Assessment:          Diagnosis Orders   1. Periurethral cyst             Plan:      Medications placedthis encounter:  No orders of the defined types were placed in this encounter. Orders placedthis encounter:  No orders of the defined types were placed in this encounter.         Follow up:  Return for Annual.

## 2022-10-26 ENCOUNTER — OFFICE VISIT (OUTPATIENT)
Dept: OBGYN CLINIC | Age: 52
End: 2022-10-26
Payer: COMMERCIAL

## 2022-10-26 VITALS
SYSTOLIC BLOOD PRESSURE: 136 MMHG | WEIGHT: 286.6 LBS | DIASTOLIC BLOOD PRESSURE: 80 MMHG | BODY MASS INDEX: 41.03 KG/M2 | HEIGHT: 70 IN | OXYGEN SATURATION: 97 % | HEART RATE: 82 BPM

## 2022-10-26 DIAGNOSIS — Z12.31 BREAST CANCER SCREENING BY MAMMOGRAM: Primary | ICD-10-CM

## 2022-10-26 DIAGNOSIS — Z01.419 WELL WOMAN EXAM WITH ROUTINE GYNECOLOGICAL EXAM: ICD-10-CM

## 2022-10-26 PROCEDURE — 99396 PREV VISIT EST AGE 40-64: CPT | Performed by: OBSTETRICS & GYNECOLOGY

## 2022-10-26 ASSESSMENT — ENCOUNTER SYMPTOMS
ALLERGIC/IMMUNOLOGIC NEGATIVE: 1
BLOOD IN STOOL: 0
ANAL BLEEDING: 0
EYES NEGATIVE: 1
NAUSEA: 0
ABDOMINAL PAIN: 0
ABDOMINAL DISTENTION: 0
VOMITING: 0
RESPIRATORY NEGATIVE: 1
DIARRHEA: 0
RECTAL PAIN: 0
CONSTIPATION: 0

## 2022-10-26 ASSESSMENT — VISUAL ACUITY: OU: 1

## 2022-10-26 NOTE — PROGRESS NOTES
Subjective:      Patient ID: Jordana Bourgeois is a 47 y.o. female    Annual exam. Reviewed medical, surgical, social and family history. Also reviewed current medications and allergies. No PMB. No GYN complaints. Per [facility] and [facility], pap not due this year. Patient counseled and desires pap performed today. Understands there may be an out of pocket cost for pap today. Screening mammogram ordered. Monthly SBE encouraged. Screening colonoscopy recommended per routine. F/U annual exam or prn. Vitals: There were no vitals taken for this visit. Past Medical History:   Diagnosis Date    Hypothyroidism     Secondary hypertension 4/13/2021    Sleep apnea 03/2016    dr Ferny Moore, mild, c pap    Unspecified vitamin D deficiency      Past Surgical History:   Procedure Laterality Date    APPENDECTOMY      CARPAL TUNNEL RELEASE      CHOLECYSTECTOMY      DILATION AND CURETTAGE OF UTERUS       Allergies:  Penicillin g, Codeine, Demerol, Meperidine, and Penicillins  Current Outpatient Medications   Medication Sig Dispense Refill    amLODIPine (NORVASC) 5 MG tablet Take 1 tablet by mouth daily 90 tablet 3    metoprolol tartrate (LOPRESSOR) 50 MG tablet Take 1 tablet by mouth 2 times daily 120 tablet 5    Cholecalciferol 50 MCG (2000 UT) CAPS Take 2,000 Units by mouth      pantoprazole (PROTONIX) 20 MG tablet Take 40 mg by mouth      SYNTHROID 137 MCG tablet        No current facility-administered medications for this visit.      Social History     Socioeconomic History    Marital status:      Spouse name: Not on file    Number of children: Not on file    Years of education: Not on file    Highest education level: Not on file   Occupational History    Not on file   Tobacco Use    Smoking status: Never    Smokeless tobacco: Never   Vaping Use    Vaping Use: Never used   Substance and Sexual Activity    Alcohol use: No     Comment: occasional/rare    Drug use: No    Sexual activity: Yes     Partners: Male Comment: vasectomy   Other Topics Concern    Not on file   Social History Narrative    Not on file     Social Determinants of Health     Financial Resource Strain: Not on file   Food Insecurity: Not on file   Transportation Needs: Not on file   Physical Activity: Not on file   Stress: Not on file   Social Connections: Not on file   Intimate Partner Violence: Not on file   Housing Stability: Not on file     Family History   Problem Relation Age of Onset    Breast Cancer Mother     Breast Cancer Sister        Review of Systems   Constitutional: Negative. Negative for activity change, appetite change, chills, diaphoresis, fatigue, fever and unexpected weight change. HENT: Negative. Eyes: Negative. Respiratory: Negative. Cardiovascular: Negative. Gastrointestinal:  Negative for abdominal distention, abdominal pain, anal bleeding, blood in stool, constipation, diarrhea, nausea, rectal pain and vomiting. Endocrine: Negative. Genitourinary:  Negative for decreased urine volume, difficulty urinating, dyspareunia, dysuria, enuresis, flank pain, frequency, genital sores, hematuria, menstrual problem, pelvic pain, urgency, vaginal bleeding, vaginal discharge and vaginal pain. Musculoskeletal: Negative. Skin: Negative. Allergic/Immunologic: Negative. Neurological: Negative. Hematological: Negative. Psychiatric/Behavioral: Negative. Objective:     Physical Exam  Constitutional:       Appearance: She is well-developed. HENT:      Head: Normocephalic. Eyes:      General: Lids are normal. Vision grossly intact. Neck:      Thyroid: No thyromegaly. Cardiovascular:      Rate and Rhythm: Normal rate and regular rhythm. Heart sounds: Normal heart sounds. Pulmonary:      Effort: Pulmonary effort is normal. No respiratory distress. Breath sounds: Normal breath sounds. No wheezing or rales. Chest:      Chest wall: No tenderness.    Breasts:     Right: Normal. No swelling, bleeding, inverted nipple, mass, nipple discharge, skin change or tenderness. Left: Normal. No swelling, bleeding, inverted nipple, mass, nipple discharge, skin change or tenderness. Abdominal:      General: There is no distension. Palpations: Abdomen is soft. There is no mass. Tenderness: There is no abdominal tenderness. There is no guarding or rebound. Hernia: No hernia is present. There is no hernia in the left inguinal area or right inguinal area. Genitourinary:     General: Normal vulva. Pubic Area: No rash. Labia:         Right: No rash, tenderness, lesion or injury. Left: No rash, tenderness, lesion or injury. Urethra: No prolapse, urethral swelling or urethral lesion. Vagina: Normal. No signs of injury and foreign body. No vaginal discharge, erythema, tenderness or bleeding. Cervix: No cervical motion tenderness, discharge or friability. Uterus: Not deviated, not enlarged, not fixed and not tender. Adnexa:         Right: No mass, tenderness or fullness. Left: No mass, tenderness or fullness. Rectum: Normal.   Musculoskeletal:         General: No tenderness. Normal range of motion. Cervical back: Normal range of motion and neck supple. Lymphadenopathy:      Cervical: No cervical adenopathy. Upper Body:      Right upper body: No supraclavicular or axillary adenopathy. Left upper body: No supraclavicular or axillary adenopathy. Lower Body: No right inguinal adenopathy. No left inguinal adenopathy. Skin:     General: Skin is warm and dry. Coloration: Skin is not pale. Findings: No erythema or rash. Neurological:      Mental Status: She is alert and oriented to person, place, and time. Psychiatric:         Behavior: Behavior normal.         Thought Content: Thought content normal.         Judgment: Judgment normal.       Assessment:       Diagnosis Orders   1.  Breast cancer screening by mammogram  YESSICA DIGITAL SCREEN W OR WO CAD BILATERAL      2. Well woman exam with routine gynecological exam             Plan:      Medications placedthis encounter:  No orders of the defined types were placed in this encounter. Orders placedthis encounter:  Orders Placed This Encounter   Procedures    YESSICA DIGITAL SCREEN W OR WO CAD BILATERAL     Standing Status:   Future     Standing Expiration Date:   12/24/2023     Order Specific Question:   Reason for exam:     Answer:   Yearly mammogram         Follow up:  Return in about 1 year (around 10/26/2023) for Annual.  Repeat Annual GYN exam every 1 year. Cervical Cytology exam starts age 24. If Negative Cytology;  follow-up screening per current guidelines. Mammograms yearly starting at age 36. Calcium and Vitamin D dosing reviewed ( age appropriate ). Colonoscopy and bone density screening discussed ( age appropriate ). Birth control and STD prevention discussed ( age appropriate ). Gardisil counseling completed for all patients ( age appropriate ). Routine health maintenance ( per PCP and guidelines ).

## 2022-11-04 ENCOUNTER — TELEPHONE (OUTPATIENT)
Dept: OBGYN CLINIC | Age: 52
End: 2022-11-04

## 2022-11-04 NOTE — TELEPHONE ENCOUNTER
----- Message from Sushma Rizzo MD sent at 11/3/2022  1:40 PM EDT -----  Endometrial cells on pap > 38 yo. Needs appt to discuss.

## 2022-11-07 ENCOUNTER — OFFICE VISIT (OUTPATIENT)
Dept: OBGYN CLINIC | Age: 52
End: 2022-11-07
Payer: COMMERCIAL

## 2022-11-07 VITALS
BODY MASS INDEX: 39.62 KG/M2 | WEIGHT: 283 LBS | SYSTOLIC BLOOD PRESSURE: 130 MMHG | DIASTOLIC BLOOD PRESSURE: 86 MMHG | HEIGHT: 71 IN

## 2022-11-07 DIAGNOSIS — R87.619 ENDOMETRIAL CELLS ON CERVICAL PAP SMEAR INCONSISTENT W/LMP: Primary | ICD-10-CM

## 2022-11-07 PROCEDURE — 99213 OFFICE O/P EST LOW 20 MIN: CPT | Performed by: OBSTETRICS & GYNECOLOGY

## 2022-11-07 ASSESSMENT — ENCOUNTER SYMPTOMS
ABDOMINAL PAIN: 0
ANAL BLEEDING: 0
RESPIRATORY NEGATIVE: 1
BLOOD IN STOOL: 0
EYES NEGATIVE: 1
ABDOMINAL DISTENTION: 0
CONSTIPATION: 0
VOMITING: 0
DIARRHEA: 0
RECTAL PAIN: 0
NAUSEA: 0
ALLERGIC/IMMUNOLOGIC NEGATIVE: 1

## 2022-11-07 NOTE — PATIENT INSTRUCTIONS
Patient Education        Hysteroscopy: Before Your Procedure  What is a hysteroscopy? A hysteroscopy is a procedure to find and treat problems with your uterus. It may be done to remove growths from the uterus, such as fibroids or polyps. It may also be used to diagnose and treat abnormal bleeding or fertility problems. The doctor will guide a lighted tube through the cervix and into the uterus. This tube is called a hysteroscope, or scope. The doctor will fill your uterus with air or liquid. This makes it easier to see the inside of your uterus with the scope. The doctor may also put tools through the scope to treat a problem. During this procedure, the doctor may take out a small piece of tissue for study. This is called a biopsy. Or the doctor may gently scrape tissue from the inner wall of the uterus. This is called a dilation and curettage, or D&C. If your doctor filled your uterus with liquid, most of it will flow out when the scope is removed. You will most likely go home the same day. And you will probably be able to go back to work the next day. But it depends on what was done and the type of work you do. How do you prepare for the procedure? Preparing for the procedure  Schedule your test for when you won't be having your period. Your doctor may suggest that the test be done soon after your period ends and before your ovary releases an egg (ovulates). This timing allows your doctor to see the inside of your uterus better. It also avoids doing the test when you could be pregnant. Your doctor may give you medicine to take before the test that will help open your cervix. This medicine may be placed in your vagina or taken as a pill. Or you may go to your doctor's office on the day before the procedure so that your doctor can put a small sponge in your cervix. This also helps to open your cervix.   You may be asked not to douche, use tampons, or use vaginal medicines for 24 hours before the hysteroscopy. Ask your doctor if you will need someone to take you home. Anesthesia and pain medicine can make it unsafe for you to drive or get home on your own. Understand exactly what procedure is planned, along with the risks, benefits, and other options. Tell your doctor ALL the medicines, vitamins, supplements, and herbal remedies you take. Some may increase the risk of problems during your procedure. Your doctor will tell you if you should stop taking any of them before the procedure and how soon to do it. If you take a medicine that prevents blood clots, your doctor may tell you to stop taking it before your procedure. Or your doctor may tell you to keep taking it. (These medicines include aspirin and other blood thinners.) Make sure that you understand exactly what your doctor wants you to do. Make sure your doctor and the hospital have a copy of your advance directive. If you don't have one, you may want to prepare one. It lets others know your health care wishes. It's a good thing to have before any type of surgery or procedure. What happens on the day of the procedure? Follow the instructions exactly about when to stop eating and drinking. If you don't, your procedure may be canceled. If your doctor has instructed you to take your medicines on the day of the procedure, take them with only a sip of water. Take a bath or shower before you come in for your procedure. Do not apply lotions, perfumes, deodorants, or nail polish. Take off all jewelry and piercings. And take out contact lenses, if you wear them. At the hospital or surgery center   Bring a picture ID. You will be kept comfortable and safe by your anesthesia provider. The anesthesia may make you sleep. Or it may just numb the area being worked on. The procedure usually takes less than 30 minutes. But it may take longer if a treatment (like a polyp removal) is done during the test.   When should you call your doctor?    You have questions or concerns. You don't understand how to prepare for your procedure. You become ill before the procedure (such as fever, flu, or a cold). You need to reschedule or have changed your mind about having the procedure. Where can you learn more? Go to [email or web address] and sign in to your [employer] account. Enter T562 in the [facility] box to learn more about \"Hysteroscopy: Before Your Procedure. \"     If you do not have an account, please click on the \"Sign Up Now\" link. Current as of: November 22, 2021               Content Version: 13.4  © 2006-2022 [employer]. Care instructions adapted under license by [facility] ([facility]). If you have questions about a medical condition or this instruction, always ask your healthcare professional. [address] any warranty or liability for your use of this information. Patient Education        Hysteroscopy: What to Expect at [facility] 1808 hysteroscopy is a procedure to find and treat problems with your uterus. It may have been done to remove growths from the uterus. Or it may have been done to diagnose or treat fertility problems. It can also be used to diagnose or treat abnormal bleeding. You may have cramps, discharge, or light vaginal bleeding for several days after the test. This may last longer if the hysteroscopy was used for treatment. If the doctor filled your uterus with air, your belly may feel full. You may also have shoulder pain right after the procedure. This care sheet gives you a general idea about how long it will take for you to recover. But each person recovers at a different pace. Follow the steps below to get better as quickly as possible. How can you care for yourself at home? Activity    Rest when you feel tired. You can do your normal activities when it feels okay to do so.      You may shower and take baths as usual.     Ask your doctor when it is okay for you to have sex. Diet    You can eat your normal diet. If your stomach is upset, try bland, low-fat foods like plain rice, broiled chicken, toast, and yogurt. If your bowel movements are not regular right after surgery, try to avoid constipation and straining. Drink plenty of water. Your doctor may suggest fiber, a stool softener, or a mild laxative. Medicines    Your doctor will tell you if and when you can restart your medicines. You will also be given instructions about taking any new medicines. If you take aspirin or some other blood thinner, be sure to talk to your doctor. Your doctor will tell you if and when to start taking this medicine again. Make sure that you understand exactly what your doctor wants you to do. Be safe with medicines. Take pain medicines exactly as directed. If the doctor gave you a prescription medicine for pain, take it as prescribed. If you are not taking a prescription pain medicine, ask your doctor if you can take an over-the-counter medicine. Do not take two or more pain medicines at the same time unless the doctor told you to. Many pain medicines have acetaminophen, which is Tylenol. Too much acetaminophen (Tylenol) can be harmful. If your doctor prescribed antibiotics, take them as directed. Do not stop taking them just because you feel better. You need to take the full course of antibiotics. Other instructions    You may have some light vaginal bleeding. Wear sanitary pads if needed. Do not use tampons until your doctor says it is okay. You may want to use a heating pad on your belly to help with pain. Use a low heat setting. Talk to your doctor if you want to try to get pregnant soon. They can tell you when it's safe to do so. If you don't want to get pregnant, talk with your doctor about birth control. Follow-up care is a key part of your treatment and safety.  Be sure to make and go to all appointments, and call your doctor if you are having problems. It's also a good idea to know your test results and keep a list of the medicines you take. When should you call for help? Call 911 anytime you think you may need emergency care. For example, call if:    You passed out (lost consciousness). You have chest pain, are short of breath, or cough up blood. Call your doctor now or seek immediate medical care if:    You have pain that does not get better after you take pain medicine. You cannot pass stools or gas. You have vaginal discharge that has increased in amount or smells bad. You are sick to your stomach or cannot drink fluids. You have signs of infection, such as: Increased pain, swelling, warmth, or redness. A fever. You have bright red vaginal bleeding that soaks one or more pads in an hour, or you have large clots. You have signs of a blood clot in your leg (called a deep vein thrombosis), such as:  Pain in your calf, back of the knee, thigh, or groin. Redness and swelling in your leg. Watch closely for changes in your health, and be sure to contact your doctor if you have any problems. Current as of: November 22, 2021               Content Version: 13.4  © 2006-2022 [employer]. Care instructions adapted under license by [facility] ([facility]). If you have questions about a medical condition or this instruction, always ask your healthcare professional. [place] [address] any warranty or liability for your use of this information. Patient Education        Endometrial Biopsy: About This Test  What is it? An endometrial biopsy is a way for your doctor to take a small sample of the lining of the uterus (endometrium). The sample is looked at under a microscope for abnormal cells. An endometrial biopsy helps your doctor find problems in the endometrium. Why is this test done?   An endometrial biopsy is done to check for abnormal cells in the lining of the uterus (endometrium). It checks for precancerous and cancerous cells. It may be done if you are at higher risk for uterine cancer or have symptoms of uterine cancer, such as abnormal bleeding from your uterus. How do you prepare for the test?  If you take aspirin or some other blood thinner, ask your doctor if you should stop taking it before your test. Make sure that you understand exactly what your doctor wants you to do. These medicines increase the risk of bleeding. Ask your doctor if you should take a pain reliever, such as ibuprofen (Advil or Motrin), 30 to 60 minutes before the test. This can help reduce any cramping pain that the test can cause. How is the test done? You will lie on an exam table. Your feet will be in footrests. The doctor will use a tool called a speculum to see the cervix. The doctor may use a spray or injection to numb your cervix. The cervix is the opening to the uterus. Then the doctor will pass a thin tube through the cervix to take a sample of the uterus lining. The sample is sent to a lab. How long does the test take? The test will take about 5 to 15 minutes. What happens after the test?  You will probably be able to go home right away. You likely will have mild vaginal bleeding and may have cramps for a few days after the test. The cramps may feel like menstrual cramps. How can you care for yourself at home? Ask your doctor if you can take an over-the-counter pain medicine, such as acetaminophen (Tylenol), ibuprofen (Advil, Motrin), or naproxen (Aleve). Be safe with medicines. Read and follow all instructions on the label. Use pads for vaginal bleeding or discharge. You may return to all your usual activities (including sex) when you feel like it. Follow-up care is a key part of your treatment and safety. Be sure to make and go to all appointments, and call your doctor if you are having problems. It's also a good idea to keep a list of the medicines you take.  Ask your doctor when you can expect to have your test results. Where can you learn more? Go to [email or web address] and sign in to your [employer] account. Enter [phone] in the [facility] box to learn more about \"Endometrial Biopsy: About This Test.\"     If you do not have an account, please click on the \"Sign Up Now\" link. Current as of: November 22, 2021               Content Version: 13.4  © 2006-2022 [employer]. Care instructions adapted under license by [facility] ([facility]). If you have questions about a medical condition or this instruction, always ask your healthcare professional. [place] [address] any warranty or liability for your use of this information. EMBX---      You have been scheduled for an Endometrial Biopsy (EMBX). An EMBX is a procedure that involves using a soft, straw-like device to suction a small sample of lining from the uterus. An EMBX is done to find the cause of heavy, prolonged, or irregular uterine bleeding. It is also done to find the cause of uterine bleeding in women who have gone through menopause. Do I need anything prior to my EMBX? 1. No unprotected intercourse for 3-4 weeks prior to procedure  2. Take 800mg of Motrin 1 hour prior to your procedure  3. If you start your period you can still have the procedure if you are having light bleeding such as the first or last day of your cycle. What can I expect when I go home? 1. You may have pink or red tinged discharge after the procedure for up to 24 hours. 2.  This procedure may cause your period to start. 3.  If you have heavy bleeding (filling an overnight pad in 1 hour or less for 3 hrs), a fever of 100 degrees (or higher), or abdominal pain that is debilitating, please call the office immediately at [phone].          ENDOSEE - OFFICE HYSTEROSCOPY    Endosee Office Hysteroscopy is a diagnostic tool that lets your doctor see the inside of your uterus quickly and easily right in her office, without the need for general anesthesia in an operating room. Eliverto Bullocks helps find the cause of several common issues women face such as abnormal bleeding and infertility concerns. Before using Endosee, your doctor will need to look at your health history, but most women are able to have the procedure without a problem. Do I need to do anything prior to my Endosee? 1. Ultrasound of pelvis complete and Ultrasound non-ob transvaginal to be done before the Endosee. Ultrasounds will be scheduled at check-out along with Endosee and follow-up office visit for results 2 weeks after Endosee. 2.  No unprotected intercourse for 3 weeks prior to procedure  3. Take 800mg of Motrin 1 hour prior to your procedure  4. If you start your period, you can still have the Endosee done if your bleeding is very light. If your cycle is heavy please call to reschedule. 5.  Avoid vaginal medications or creams & douching for 24 hours before the procedure. 6.  Please come prepared to leave a urine sample prior to your procedure. What can I expect when I go home? 1. You may have some spotting or light discharge for up to 24 hours. 2.  You can resume all normal activities. 3.  The procedure may start your period. *If a biopsy is done, you will need to schedule a follow up appointment for 2 weeks following your procedure to get your results from the physician.

## 2022-11-07 NOTE — PROGRESS NOTES
Subjective:      Patient ID: Mandie Estrella is a 47 y. o.female    Pt here to discuss endometrial cells on pap in woman > 39years old. Reviewed medical, surgical, social and family history. Also reviewed current medications and allergies. Discussed above findings in detail and discussed US and Endosee with EMB for further evaluation. LMP 10/15/2022. Had not had bleeding for 6 days when had pap performed. Denies prolonged or unusually heavy cycles. Denies IM or PCB. Occasionally misses a cycle. All questions answered. F/U as directed. Vitals:  /86   Ht 5' 11\" (1.803 m)   Wt 283 lb (128.4 kg)   BMI 39.47 kg/m²   Past Medical History:   Diagnosis Date    Hypothyroidism     Secondary hypertension 4/13/2021    Sleep apnea 03/2016    dr Jose Glaser, mild, c pap    Unspecified vitamin D deficiency      Past Surgical History:   Procedure Laterality Date    APPENDECTOMY      CARPAL TUNNEL RELEASE      CHOLECYSTECTOMY      DILATION AND CURETTAGE OF UTERUS       Allergies:  Penicillin g, Codeine, Demerol, Meperidine, and Penicillins  Current Outpatient Medications   Medication Sig Dispense Refill    amLODIPine (NORVASC) 5 MG tablet Take 1 tablet by mouth daily 90 tablet 3    metoprolol tartrate (LOPRESSOR) 50 MG tablet Take 1 tablet by mouth 2 times daily 120 tablet 5    Cholecalciferol 50 MCG (2000 UT) CAPS Take 2,000 Units by mouth      pantoprazole (PROTONIX) 20 MG tablet Take 40 mg by mouth      SYNTHROID 137 MCG tablet        No current facility-administered medications for this visit. Social History     Socioeconomic History    Marital status:      Spouse name: Not on file    Number of children: Not on file    Years of education: Not on file    Highest education level: Not on file   Occupational History    Not on file   Tobacco Use    Smoking status: Never    Smokeless tobacco: Never   Vaping Use    Vaping Use: Never used   Substance and Sexual Activity    Alcohol use:  No Comment: occasional/rare    Drug use: No    Sexual activity: Yes     Partners: Male     Comment: vasectomy   Other Topics Concern    Not on file   Social History Narrative    Not on file     Social Determinants of Health     Financial Resource Strain: Not on file   Food Insecurity: Not on file   Transportation Needs: Not on file   Physical Activity: Not on file   Stress: Not on file   Social Connections: Not on file   Intimate Partner Violence: Not on file   Housing Stability: Not on file     Family History   Problem Relation Age of Onset    Breast Cancer Mother     Breast Cancer Sister        Review of Systems   Constitutional: Negative. Negative for activity change, appetite change, chills, diaphoresis, fatigue, fever and unexpected weight change. HENT: Negative. Eyes: Negative. Respiratory: Negative. Cardiovascular: Negative. Gastrointestinal:  Negative for abdominal distention, abdominal pain, anal bleeding, blood in stool, constipation, diarrhea, nausea, rectal pain and vomiting. Endocrine: Negative. Genitourinary:  Negative for decreased urine volume, difficulty urinating, dyspareunia, dysuria, enuresis, flank pain, frequency, genital sores, hematuria, menstrual problem, pelvic pain, urgency, vaginal bleeding, vaginal discharge and vaginal pain. Musculoskeletal: Negative. Skin: Negative. Allergic/Immunologic: Negative. Neurological: Negative. Hematological: Negative. Psychiatric/Behavioral: Negative. Objective:     Physical Exam  Constitutional:       General: She is not in acute distress. Appearance: She is well-developed. She is not diaphoretic. HENT:      Head: Normocephalic and atraumatic. Eyes:      Conjunctiva/sclera: Conjunctivae normal.   Cardiovascular:      Rate and Rhythm: Normal rate and regular rhythm. Pulmonary:      Effort: Pulmonary effort is normal. No respiratory distress. Musculoskeletal:         General: No tenderness or deformity. Normal range of motion. Cervical back: Normal range of motion and neck supple. Skin:     General: Skin is warm and dry. Coloration: Skin is not pale. Neurological:      Mental Status: She is alert and oriented to person, place, and time. Motor: No abnormal muscle tone. Coordination: Coordination normal.   Psychiatric:         Behavior: Behavior normal.         Thought Content: Thought content normal.         Judgment: Judgment normal.       Assessment:       Diagnosis Orders   1. Endometrial cells on cervical Pap smear inconsistent w/LMP  US PELVIS COMPLETE    US DUP ABD PEL RETRO SCROT COMPLETE           Plan:      Medications placedthis encounter:  No orders of the defined types were placed in this encounter. Orders placedthis encounter:  Orders Placed This Encounter   Procedures    US PELVIS COMPLETE     Standing Status:   Future     Standing Expiration Date:   11/7/2023     Scheduling Instructions: With transvaginal    US DUP ABD PEL RETRO SCROT COMPLETE     Standing Status:   Future     Standing Expiration Date:   11/7/2023         Follow up:  Return for Results Review, Pelvic US, Endosee w/ bx.

## 2022-11-11 ENCOUNTER — HOSPITAL ENCOUNTER (OUTPATIENT)
Dept: ULTRASOUND IMAGING | Age: 52
Discharge: HOME | End: 2022-11-13
Payer: COMMERCIAL

## 2022-11-11 DIAGNOSIS — R87.619 ENDOMETRIAL CELLS ON CERVICAL PAP SMEAR INCONSISTENT W/LMP: ICD-10-CM

## 2022-11-11 PROCEDURE — 93975 VASCULAR STUDY: CPT

## 2022-11-11 PROCEDURE — 76830 TRANSVAGINAL US NON-OB: CPT

## 2022-11-11 PROCEDURE — 76856 US EXAM PELVIC COMPLETE: CPT

## 2022-12-07 ENCOUNTER — PROCEDURE VISIT (OUTPATIENT)
Dept: OBGYN CLINIC | Age: 52
End: 2022-12-07

## 2022-12-07 VITALS
SYSTOLIC BLOOD PRESSURE: 132 MMHG | HEIGHT: 70 IN | WEIGHT: 289 LBS | BODY MASS INDEX: 41.37 KG/M2 | DIASTOLIC BLOOD PRESSURE: 86 MMHG

## 2022-12-07 DIAGNOSIS — R87.619 ENDOMETRIAL CELLS ON CERVICAL PAP SMEAR INCONSISTENT W/LMP: ICD-10-CM

## 2022-12-07 DIAGNOSIS — R87.619 ENDOMETRIAL CELLS ON CERVICAL PAP SMEAR INCONSISTENT W/LMP: Primary | ICD-10-CM

## 2022-12-07 DIAGNOSIS — Z32.02 URINE PREGNANCY TEST NEGATIVE: ICD-10-CM

## 2022-12-07 LAB
HCG, URINE, POC: NEGATIVE
Lab: NORMAL
NEGATIVE QC PASS/FAIL: NORMAL
POSITIVE QC PASS/FAIL: NORMAL

## 2022-12-07 NOTE — PROGRESS NOTES
ENDOSEE PROCEDURE NOTE:    Pre-Procedure Dx:  48 yo female with endometrial cells on pap. Post-Procedure Dx:  Same      Procedure:  Endosee procedure and Endometrial pipelle bx with ECC      Provider:  Vivi Loaiza M.D. Normal Saline:  25 cc      Findings:  Patient presents for Endosee procedure. Risks and benefits discussed. Consents signed. Motrin 800 mg po x 1 pre-procedure. SSE inserted for directed visualization of cervix. Cervix prepped with Betadine. Single tooth tenaculum placed on anterior lip of the cervix. Cervical os gently dilated with os finder. Endosee device inserted without difficulty to level of fundus. 25 cc of NS injected in routine fashion. Visualization of intra-uterine cavity and internal cervical os reveals 2-3 endometrial polyps, at least one moderate size polyp posterior wall. NS gently aspirated from cavity and Endosee removed. Endometrial biopsy then performed with pipelle in routine fashion without difficulty. ECC performed. Tenaculum removed from cervix and tenaculum sites hemostatic. Patient appeared to tolerate the procedure well. Discharge instructions discussed. Complications:  None      Disposition:  Stable to home. F/U 2 weeks results. Pt also here to discuss US results. US as follows:    EXAMINATION:   PELVIC ULTRASOUND; DOPPLER EVALUATION OF THE PELVIS       11/11/2022       TECHNIQUE:   Multiple longitudinal and transverse grayscale images were obtained of the   pelvis using transabdominal and transvaginal sonographic probes. In   addition, color and pulse wave Doppler imaging was performed. COMPARISON:   None       HISTORY:   ORDERING SYSTEM PROVIDED HISTORY: Endometrial cells on cervical Pap smear   inconsistent w/LMP   TECHNOLOGIST PROVIDED HISTORY:   What reading provider will be dictating this exam?->CRC       FINDINGS:       Measurements:       Uterus: 9.5 x 4.0 x 5.5 cm.        Endometrial stripe: Approximately 1.0 cm in thickness. Right Ovary:Not measured. Left Ovary: 3.6 x 2.5 x 3.1 cm. Ultrasound Findings:       Uterus: There is a small hyperechoic focus in the right aspect of the uterus   which measures approximately 5 mm in size. Endometrial stripe: Endometrial stripe is within normal limits. There is   trace fluid within the endocervical canal.       Right Ovary: Right ovary is not visualized. Left Ovary:  Left ovary is within normal limits. There is normal arterial and   venous blood flow. Free Fluid: No evidence of free fluid. Impression   1. There is a small hyperechoic focus in the myometrium in the right aspect   of the uterus which could represent calcification. A mass such as leiomyoma   or lipoleiomyoma is less likely. 2. The right ovary is not visualized. Discussed pelvic US. Endometrium 1.0 cm without obvious masses. As above, multiple polyps noted on Endosee. US otherwise negative. All questions answered. F/U 2 weeks for results. All questions answered.

## 2022-12-07 NOTE — PATIENT INSTRUCTIONS
Patient Education        Hysteroscopy: Before Your Procedure  What is a hysteroscopy? A hysteroscopy is a procedure to find and treat problems with your uterus. It may be done to remove growths from the uterus, such as fibroids or polyps. It may also be used to diagnose and treat abnormal bleeding or fertility problems. The doctor will guide a lighted tube through the cervix and into the uterus. This tube is called a hysteroscope, or scope. The doctor will fill your uterus with air or liquid. This makes it easier to see the inside of your uterus with the scope. The doctor may also put tools through the scope to treat a problem. During this procedure, the doctor may take out a small piece of tissue for study. This is called a biopsy. Or the doctor may gently scrape tissue from the inner wall of the uterus. This is called a dilation and curettage, or D&C. If your doctor filled your uterus with liquid, most of it will flow out when the scope is removed. You will most likely go home the same day. And you will probably be able to go back to work the next day. But it depends on what was done and the type of work you do. How do you prepare for the procedure? Preparing for the procedure  Schedule your test for when you won't be having your period. Your doctor may suggest that the test be done soon after your period ends and before your ovary releases an egg (ovulates). This timing allows your doctor to see the inside of your uterus better. It also avoids doing the test when you could be pregnant. Your doctor may give you medicine to take before the test that will help open your cervix. This medicine may be placed in your vagina or taken as a pill. Or you may go to your doctor's office on the day before the procedure so that your doctor can put a small sponge in your cervix. This also helps to open your cervix.   You may be asked not to douche, use tampons, or use vaginal medicines for 24 hours before the hysteroscopy. Ask your doctor if you will need someone to take you home. Anesthesia and pain medicine can make it unsafe for you to drive or get home on your own. Understand exactly what procedure is planned, along with the risks, benefits, and other options. Tell your doctor ALL the medicines, vitamins, supplements, and herbal remedies you take. Some may increase the risk of problems during your procedure. Your doctor will tell you if you should stop taking any of them before the procedure and how soon to do it. If you take a medicine that prevents blood clots, your doctor may tell you to stop taking it before your procedure. Or your doctor may tell you to keep taking it. (These medicines include aspirin and other blood thinners.) Make sure that you understand exactly what your doctor wants you to do. Make sure your doctor and the hospital have a copy of your advance directive. If you don't have one, you may want to prepare one. It lets others know your health care wishes. It's a good thing to have before any type of surgery or procedure. What happens on the day of the procedure? Follow the instructions exactly about when to stop eating and drinking. If you don't, your procedure may be canceled. If your doctor has instructed you to take your medicines on the day of the procedure, take them with only a sip of water. Take a bath or shower before you come in for your procedure. Do not apply lotions, perfumes, deodorants, or nail polish. Take off all jewelry and piercings. And take out contact lenses, if you wear them. At the hospital or surgery center   Bring a picture ID. You will be kept comfortable and safe by your anesthesia provider. The anesthesia may make you sleep. Or it may just numb the area being worked on. The procedure usually takes less than 30 minutes. But it may take longer if a treatment (like a polyp removal) is done during the test.   When should you call your doctor?    You have questions or concerns. You don't understand how to prepare for your procedure. You become ill before the procedure (such as fever, flu, or a cold). You need to reschedule or have changed your mind about having the procedure. Where can you learn more? Go to [email or web address] and sign in to your [employer] account. Enter I682 in the [facility] box to learn more about \"Hysteroscopy: Before Your Procedure. \"     If you do not have an account, please click on the \"Sign Up Now\" link. Current as of: November 22, 2021               Content Version: 13.4  © 2006-2022 [employer]. Care instructions adapted under license by [facility] ([facility]). If you have questions about a medical condition or this instruction, always ask your healthcare professional. [address] any warranty or liability for your use of this information. Patient Education        Hysteroscopy: What to Expect at [facility] 1808 hysteroscopy is a procedure to find and treat problems with your uterus. It may have been done to remove growths from the uterus. Or it may have been done to diagnose or treat fertility problems. It can also be used to diagnose or treat abnormal bleeding. You may have cramps, discharge, or light vaginal bleeding for several days after the test. This may last longer if the hysteroscopy was used for treatment. If the doctor filled your uterus with air, your belly may feel full. You may also have shoulder pain right after the procedure. This care sheet gives you a general idea about how long it will take for you to recover. But each person recovers at a different pace. Follow the steps below to get better as quickly as possible. How can you care for yourself at home? Activity    Rest when you feel tired. You can do your normal activities when it feels okay to do so.      You may shower and take baths as usual.     Ask your doctor when it is okay for you to have sex. Diet    You can eat your normal diet. If your stomach is upset, try bland, low-fat foods like plain rice, broiled chicken, toast, and yogurt. If your bowel movements are not regular right after surgery, try to avoid constipation and straining. Drink plenty of water. Your doctor may suggest fiber, a stool softener, or a mild laxative. Medicines    Your doctor will tell you if and when you can restart your medicines. You will also be given instructions about taking any new medicines. If you take aspirin or some other blood thinner, be sure to talk to your doctor. Your doctor will tell you if and when to start taking this medicine again. Make sure that you understand exactly what your doctor wants you to do. Be safe with medicines. Take pain medicines exactly as directed. If the doctor gave you a prescription medicine for pain, take it as prescribed. If you are not taking a prescription pain medicine, ask your doctor if you can take an over-the-counter medicine. Do not take two or more pain medicines at the same time unless the doctor told you to. Many pain medicines have acetaminophen, which is Tylenol. Too much acetaminophen (Tylenol) can be harmful. If your doctor prescribed antibiotics, take them as directed. Do not stop taking them just because you feel better. You need to take the full course of antibiotics. Other instructions    You may have some light vaginal bleeding. Wear sanitary pads if needed. Do not use tampons until your doctor says it is okay. You may want to use a heating pad on your belly to help with pain. Use a low heat setting. Talk to your doctor if you want to try to get pregnant soon. They can tell you when it's safe to do so. If you don't want to get pregnant, talk with your doctor about birth control. Follow-up care is a key part of your treatment and safety.  Be sure to make and go to all appointments, and call your doctor if you are having problems. It's also a good idea to know your test results and keep a list of the medicines you take. When should you call for help? Call 911 anytime you think you may need emergency care. For example, call if:    You passed out (lost consciousness). You have chest pain, are short of breath, or cough up blood. Call your doctor now or seek immediate medical care if:    You have pain that does not get better after you take pain medicine. You cannot pass stools or gas. You have vaginal discharge that has increased in amount or smells bad. You are sick to your stomach or cannot drink fluids. You have signs of infection, such as: Increased pain, swelling, warmth, or redness. A fever. You have bright red vaginal bleeding that soaks one or more pads in an hour, or you have large clots. You have signs of a blood clot in your leg (called a deep vein thrombosis), such as:  Pain in your calf, back of the knee, thigh, or groin. Redness and swelling in your leg. Watch closely for changes in your health, and be sure to contact your doctor if you have any problems. Current as of: November 22, 2021               Content Version: 13.4  © 2006-2022 [employer]. Care instructions adapted under license by [facility] ([facility]). If you have questions about a medical condition or this instruction, always ask your healthcare professional. [place] [address] any warranty or liability for your use of this information.

## 2022-12-09 NOTE — PROGRESS NOTES
Referral from Dr Akhil Maguire  for Dx hysteroscopy D and C and endometrial polypectomy for multiple polyps noted on Endosee. Please schedule follow up.

## 2022-12-21 ENCOUNTER — OFFICE VISIT (OUTPATIENT)
Dept: OBGYN CLINIC | Age: 52
End: 2022-12-21
Payer: COMMERCIAL

## 2022-12-21 VITALS
DIASTOLIC BLOOD PRESSURE: 82 MMHG | BODY MASS INDEX: 41.23 KG/M2 | WEIGHT: 288 LBS | HEIGHT: 70 IN | SYSTOLIC BLOOD PRESSURE: 114 MMHG | HEART RATE: 76 BPM

## 2022-12-21 DIAGNOSIS — N84.0 ENDOMETRIAL POLYP: ICD-10-CM

## 2022-12-21 DIAGNOSIS — R87.619 ENDOMETRIAL CELLS ON CERVICAL PAP SMEAR INCONSISTENT W/LMP: Primary | ICD-10-CM

## 2022-12-21 PROCEDURE — 99214 OFFICE O/P EST MOD 30 MIN: CPT | Performed by: OBSTETRICS & GYNECOLOGY

## 2022-12-21 RX ORDER — POLYETHYLENE GLYCOL 3350 17 G/17G
POWDER, FOR SOLUTION ORAL
Qty: 238 G | Refills: 0 | Status: SHIPPED | OUTPATIENT
Start: 2022-12-21

## 2022-12-21 RX ORDER — CLINDAMYCIN PHOSPHATE 20 MG/G
CREAM VAGINAL
Qty: 1 EACH | Refills: 0 | Status: SHIPPED | OUTPATIENT
Start: 2022-12-21

## 2022-12-21 NOTE — PROGRESS NOTES
Carolyn Beard is a 47 y.o. female who presents here today for complaints of Surgical Consult (Referred by Dr. Jayy Arana.)    Endometrial cells on pap smear . Thickened endometrium with multiple uterine polyps on hysteroscopy. Heavy menses. EMB results : benign .        Vitals:  /82 (Site: Right Upper Arm, Position: Sitting, Cuff Size: Large Adult)   Pulse 76   Ht 5' 10\" (1.778 m)   Wt 288 lb (130.6 kg)   LMP 11/15/2022 (Approximate)   BMI 41.32 kg/m²   Allergies:  Penicillin g, Codeine, Demerol, Meperidine, and Penicillins  Past Medical History:   Diagnosis Date    Hypothyroidism     Secondary hypertension 2021    Sleep apnea 2016    dr Kecia Jacobs, mild, c pap    Unspecified vitamin D deficiency      Past Surgical History:   Procedure Laterality Date    APPENDECTOMY      CARPAL TUNNEL RELEASE      CHOLECYSTECTOMY      DILATION AND CURETTAGE OF UTERUS       OB History          1    Para        Term   0            AB   1    Living   0         SAB   1    IAB        Ectopic        Molar        Multiple        Live Births                  Family History   Problem Relation Age of Onset    Breast Cancer Mother     Breast Cancer Sister      Social History     Socioeconomic History    Marital status:      Spouse name: Not on file    Number of children: Not on file    Years of education: Not on file    Highest education level: Not on file   Occupational History    Not on file   Tobacco Use    Smoking status: Never    Smokeless tobacco: Never   Vaping Use    Vaping Use: Never used   Substance and Sexual Activity    Alcohol use: No     Comment: occasional/rare    Drug use: No    Sexual activity: Yes     Partners: Male     Comment: vasectomy   Other Topics Concern    Not on file   Social History Narrative    Not on file     Social Determinants of Health     Financial Resource Strain: Not on file   Food Insecurity: Not on file   Transportation Needs: Not on file   Physical Activity: Not on file   Stress: Not on file   Social Connections: Not on file   Intimate Partner Violence: Not on file   Housing Stability: Not on file       Contraceptive method:  none    Patient's medications, allergies, past medical, surgical, social and family histories were reviewed and updated as appropriate. Review of Systems  As per chief complaint   All other systems reviewed and are negative. Heavy vaginal bleeding . Physical Exam:  Vitals:  /82 (Site: Right Upper Arm, Position: Sitting, Cuff Size: Large Adult)   Pulse 76   Ht 5' 10\" (1.778 m)   Wt 288 lb (130.6 kg)   LMP 11/15/2022 (Approximate)   BMI 41.32 kg/m²   Lungs: CTAB   Heart : Regular S1/S2, no M/R/G  Abdomen: Soft , NT, ND , + BS   Pelvic exam : deferred    Assessment:      Diagnosis Orders   1. Endometrial cells on cervical Pap smear inconsistent w/LMP        2. Endometrial polyp            Plan:     Menorrhagia , multiple uterine polyps , risk factors for endomtrial cancer : obesity , polyps , , > 49 yo . Patient good candidate for definitive treatment :   [facility]   Counseling: The patient was counseled on all options both medical and surgical, conservative as well as definitive. She has elected to proceed with the procedure as stated above. The patient was counseled on the procedure. Risks and complications were reviewed in detail. The patients orders, labs, consents have been completed. The history and physical as well as all supporting surgical documentation will be forwarded to the pre-operative holding area. The patient is aware that this procedure may not alleviate her symptoms. That there may be a necessity for a second surgery and that there may be an incomplete removal of abnormal tissue.     Discussed all risks and benefits of procedure in detail including risks of anesthesia, blood loss, need for transfusion, infection;  also potential for complication, injury, need for repair and/or removal of uterus, tubes, ovaries, bowel, bladder, ureters, blood vessels and nerves. Also discussed pre-operative and post-operative expectations. Patient verbalizes understanding. Patient was seen with total face to face time of 30 minutes. More than 50% of this visit was counseling and education regarding The primary encounter diagnosis was Endometrial cells on cervical Pap smear inconsistent w/LMP. A diagnosis of Endometrial polyp was also pertinent to this visit. and Surgical Consult (Referred by Dr. Raman Medeiros)   as well as  counseling on preventative health maintenance follow-up. No orders of the defined types were placed in this encounter. Orders Placed This Encounter   Medications    polyethylene glycol (MIRALAX) 17 GM/SCOOP powder     Sig: Use ONE bottle in AM .     Dispense:  238 g     Refill:  0    clindamycin (CLEOCIN) 2 % vaginal cream     Sig: USE one applicatorful vaginally nightly daily for 1 week prior to procedure     Dispense:  1 each     Refill:  0       Follow Up:  Return for scheduled for surgery.         Jacquelin Saenz MD

## 2023-01-09 ENCOUNTER — PREP FOR PROCEDURE (OUTPATIENT)
Dept: OBGYN CLINIC | Age: 53
End: 2023-01-09

## 2023-01-09 PROBLEM — R87.619 ABNORMAL GLANDULAR PAPANICOLAOU SMEAR OF CERVIX: Status: ACTIVE | Noted: 2023-01-09

## 2023-01-09 PROBLEM — N84.0 ENDOMETRIAL POLYP: Status: ACTIVE | Noted: 2023-01-09

## 2023-02-23 ENCOUNTER — HOSPITAL ENCOUNTER (OUTPATIENT)
Dept: PREADMISSION TESTING | Age: 53
Discharge: HOME | End: 2023-02-27
Payer: COMMERCIAL

## 2023-02-23 VITALS
HEART RATE: 76 BPM | BODY MASS INDEX: 40.8 KG/M2 | TEMPERATURE: 97.9 F | RESPIRATION RATE: 16 BRPM | SYSTOLIC BLOOD PRESSURE: 140 MMHG | WEIGHT: 285 LBS | HEIGHT: 70 IN | OXYGEN SATURATION: 97 % | DIASTOLIC BLOOD PRESSURE: 78 MMHG

## 2023-02-23 LAB
ANION GAP SERPL CALCULATED.3IONS-SCNC: 12 MEQ/L (ref 9–15)
APTT: 28.9 SEC (ref 24.4–36.8)
BUN BLDV-MCNC: 15 MG/DL (ref 6–20)
CALCIUM SERPL-MCNC: 9.4 MG/DL (ref 8.5–9.9)
CHLORIDE BLD-SCNC: 105 MEQ/L (ref 95–107)
CO2: 24 MEQ/L (ref 20–31)
CREAT SERPL-MCNC: 1.03 MG/DL (ref 0.5–0.9)
GFR SERPL CREATININE-BSD FRML MDRD: >60 ML/MIN/{1.73_M2}
GLUCOSE BLD-MCNC: 90 MG/DL (ref 70–99)
HCT VFR BLD CALC: 38.7 % (ref 37–47)
HEMOGLOBIN: 12.7 G/DL (ref 12–16)
INR BLD: 1.1
MCH RBC QN AUTO: 26.6 PG (ref 27–31.3)
MCHC RBC AUTO-ENTMCNC: 32.7 % (ref 33–37)
MCV RBC AUTO: 81.4 FL (ref 79.4–94.8)
PDW BLD-RTO: 15.9 % (ref 11.5–14.5)
PLATELET # BLD: 312 K/UL (ref 130–400)
POTASSIUM SERPL-SCNC: 3.9 MEQ/L (ref 3.4–4.9)
PROTHROMBIN TIME: 13.9 SEC (ref 12.3–14.9)
RBC # BLD: 4.76 M/UL (ref 4.2–5.4)
SODIUM BLD-SCNC: 141 MEQ/L (ref 135–144)
WBC # BLD: 8.6 K/UL (ref 4.8–10.8)

## 2023-02-23 PROCEDURE — 85610 PROTHROMBIN TIME: CPT

## 2023-02-23 PROCEDURE — 86850 RBC ANTIBODY SCREEN: CPT

## 2023-02-23 PROCEDURE — 86901 BLOOD TYPING SEROLOGIC RH(D): CPT

## 2023-02-23 PROCEDURE — 86900 BLOOD TYPING SEROLOGIC ABO: CPT

## 2023-02-23 PROCEDURE — 85027 COMPLETE CBC AUTOMATED: CPT

## 2023-02-23 PROCEDURE — 80048 BASIC METABOLIC PNL TOTAL CA: CPT

## 2023-02-23 PROCEDURE — 85730 THROMBOPLASTIN TIME PARTIAL: CPT

## 2023-02-23 NOTE — PROGRESS NOTES
Saima FIGUEROA and Dr. Maia Johnson instruction sheet reviewed with patient who verbalized understanding.

## 2023-02-24 LAB
ABO/RH: NORMAL
ANTIBODY SCREEN: NORMAL

## 2023-03-01 ENCOUNTER — ANESTHESIA EVENT (OUTPATIENT)
Dept: OPERATING ROOM | Age: 53
End: 2023-03-01
Payer: COMMERCIAL

## 2023-03-02 ENCOUNTER — ANESTHESIA (OUTPATIENT)
Dept: OPERATING ROOM | Age: 53
End: 2023-03-02
Payer: COMMERCIAL

## 2023-03-02 ENCOUNTER — HOSPITAL ENCOUNTER (OUTPATIENT)
Age: 53
Setting detail: OUTPATIENT SURGERY
Discharge: HOME | End: 2023-03-02
Attending: OBSTETRICS & GYNECOLOGY | Admitting: OBSTETRICS & GYNECOLOGY
Payer: COMMERCIAL

## 2023-03-02 VITALS
WEIGHT: 285 LBS | BODY MASS INDEX: 40.8 KG/M2 | HEIGHT: 70 IN | TEMPERATURE: 97.1 F | HEART RATE: 67 BPM | RESPIRATION RATE: 16 BRPM | SYSTOLIC BLOOD PRESSURE: 149 MMHG | DIASTOLIC BLOOD PRESSURE: 85 MMHG | OXYGEN SATURATION: 100 %

## 2023-03-02 DIAGNOSIS — R87.619 ABNORMAL GLANDULAR PAPANICOLAOU SMEAR OF CERVIX: ICD-10-CM

## 2023-03-02 DIAGNOSIS — N84.0 ENDOMETRIAL POLYP: ICD-10-CM

## 2023-03-02 DIAGNOSIS — G89.18 POSTOPERATIVE PAIN: Primary | ICD-10-CM

## 2023-03-02 LAB
HCG, URINE, POC: NEGATIVE
Lab: NORMAL
NEGATIVE QC PASS/FAIL: NORMAL
POSITIVE QC PASS/FAIL: NORMAL

## 2023-03-02 PROCEDURE — 2580000003 HC RX 258: Performed by: ANESTHESIOLOGY

## 2023-03-02 PROCEDURE — 7100000001 HC PACU RECOVERY - ADDTL 15 MIN: Performed by: OBSTETRICS & GYNECOLOGY

## 2023-03-02 PROCEDURE — 6360000002 HC RX W HCPCS: Performed by: OBSTETRICS & GYNECOLOGY

## 2023-03-02 PROCEDURE — 2500000003 HC RX 250 WO HCPCS

## 2023-03-02 PROCEDURE — 7100000000 HC PACU RECOVERY - FIRST 15 MIN: Performed by: OBSTETRICS & GYNECOLOGY

## 2023-03-02 PROCEDURE — 2580000003 HC RX 258: Performed by: OBSTETRICS & GYNECOLOGY

## 2023-03-02 PROCEDURE — 3600000014 HC SURGERY LEVEL 4 ADDTL 15MIN: Performed by: OBSTETRICS & GYNECOLOGY

## 2023-03-02 PROCEDURE — 7100000011 HC PHASE II RECOVERY - ADDTL 15 MIN: Performed by: OBSTETRICS & GYNECOLOGY

## 2023-03-02 PROCEDURE — 64488 TAP BLOCK BI INJECTION: CPT | Performed by: ANESTHESIOLOGY

## 2023-03-02 PROCEDURE — 7100000010 HC PHASE II RECOVERY - FIRST 15 MIN: Performed by: OBSTETRICS & GYNECOLOGY

## 2023-03-02 PROCEDURE — 2500000003 HC RX 250 WO HCPCS: Performed by: OBSTETRICS & GYNECOLOGY

## 2023-03-02 PROCEDURE — 2709999900 HC NON-CHARGEABLE SUPPLY: Performed by: OBSTETRICS & GYNECOLOGY

## 2023-03-02 PROCEDURE — 3700000000 HC ANESTHESIA ATTENDED CARE: Performed by: OBSTETRICS & GYNECOLOGY

## 2023-03-02 PROCEDURE — 6360000002 HC RX W HCPCS: Performed by: ANESTHESIOLOGY

## 2023-03-02 PROCEDURE — 88342 IMHCHEM/IMCYTCHM 1ST ANTB: CPT

## 2023-03-02 PROCEDURE — 58571 TLH W/T/O 250 G OR LESS: CPT | Performed by: OBSTETRICS & GYNECOLOGY

## 2023-03-02 PROCEDURE — A4217 STERILE WATER/SALINE, 500 ML: HCPCS | Performed by: OBSTETRICS & GYNECOLOGY

## 2023-03-02 PROCEDURE — 2720000010 HC SURG SUPPLY STERILE: Performed by: OBSTETRICS & GYNECOLOGY

## 2023-03-02 PROCEDURE — 6360000002 HC RX W HCPCS

## 2023-03-02 PROCEDURE — 3700000001 HC ADD 15 MINUTES (ANESTHESIA): Performed by: OBSTETRICS & GYNECOLOGY

## 2023-03-02 PROCEDURE — 3600000004 HC SURGERY LEVEL 4 BASE: Performed by: OBSTETRICS & GYNECOLOGY

## 2023-03-02 PROCEDURE — 6370000000 HC RX 637 (ALT 250 FOR IP)

## 2023-03-02 PROCEDURE — 88307 TISSUE EXAM BY PATHOLOGIST: CPT

## 2023-03-02 PROCEDURE — 2580000003 HC RX 258

## 2023-03-02 RX ORDER — SODIUM CHLORIDE 9 MG/ML
INJECTION, SOLUTION INTRAVENOUS PRN
Status: DISCONTINUED | OUTPATIENT
Start: 2023-03-02 | End: 2023-03-02 | Stop reason: HOSPADM

## 2023-03-02 RX ORDER — OXYCODONE HYDROCHLORIDE 5 MG/1
10 TABLET ORAL PRN
Status: DISCONTINUED | OUTPATIENT
Start: 2023-03-02 | End: 2023-03-02 | Stop reason: HOSPADM

## 2023-03-02 RX ORDER — DEXTROSE MONOHYDRATE 100 MG/ML
INJECTION, SOLUTION INTRAVENOUS CONTINUOUS PRN
Status: DISCONTINUED | OUTPATIENT
Start: 2023-03-02 | End: 2023-03-02 | Stop reason: HOSPADM

## 2023-03-02 RX ORDER — MIDAZOLAM HYDROCHLORIDE 1 MG/ML
INJECTION INTRAMUSCULAR; INTRAVENOUS PRN
Status: DISCONTINUED | OUTPATIENT
Start: 2023-03-02 | End: 2023-03-02 | Stop reason: SDUPTHER

## 2023-03-02 RX ORDER — FENTANYL CITRATE 50 UG/ML
INJECTION, SOLUTION INTRAMUSCULAR; INTRAVENOUS PRN
Status: DISCONTINUED | OUTPATIENT
Start: 2023-03-02 | End: 2023-03-02 | Stop reason: SDUPTHER

## 2023-03-02 RX ORDER — ACETAMINOPHEN 500 MG
1000 TABLET ORAL EVERY 6 HOURS PRN
Qty: 60 TABLET | Refills: 0 | Status: SHIPPED | OUTPATIENT
Start: 2023-03-02

## 2023-03-02 RX ORDER — HYDRALAZINE HYDROCHLORIDE 20 MG/ML
10 INJECTION INTRAMUSCULAR; INTRAVENOUS
Status: DISCONTINUED | OUTPATIENT
Start: 2023-03-02 | End: 2023-03-02 | Stop reason: HOSPADM

## 2023-03-02 RX ORDER — PROPOFOL 10 MG/ML
INJECTION, EMULSION INTRAVENOUS PRN
Status: DISCONTINUED | OUTPATIENT
Start: 2023-03-02 | End: 2023-03-02 | Stop reason: SDUPTHER

## 2023-03-02 RX ORDER — METOCLOPRAMIDE HYDROCHLORIDE 5 MG/ML
10 INJECTION INTRAMUSCULAR; INTRAVENOUS
Status: DISCONTINUED | OUTPATIENT
Start: 2023-03-02 | End: 2023-03-02 | Stop reason: HOSPADM

## 2023-03-02 RX ORDER — LABETALOL HYDROCHLORIDE 5 MG/ML
10 INJECTION, SOLUTION INTRAVENOUS
Status: DISCONTINUED | OUTPATIENT
Start: 2023-03-02 | End: 2023-03-02 | Stop reason: HOSPADM

## 2023-03-02 RX ORDER — DEXAMETHASONE SODIUM PHOSPHATE 4 MG/ML
INJECTION, SOLUTION INTRA-ARTICULAR; INTRALESIONAL; INTRAMUSCULAR; INTRAVENOUS; SOFT TISSUE PRN
Status: DISCONTINUED | OUTPATIENT
Start: 2023-03-02 | End: 2023-03-02 | Stop reason: SDUPTHER

## 2023-03-02 RX ORDER — SODIUM CHLORIDE, SODIUM LACTATE, POTASSIUM CHLORIDE, CALCIUM CHLORIDE 600; 310; 30; 20 MG/100ML; MG/100ML; MG/100ML; MG/100ML
INJECTION, SOLUTION INTRAVENOUS CONTINUOUS PRN
Status: DISCONTINUED | OUTPATIENT
Start: 2023-03-02 | End: 2023-03-02 | Stop reason: SDUPTHER

## 2023-03-02 RX ORDER — ROCURONIUM BROMIDE 10 MG/ML
INJECTION, SOLUTION INTRAVENOUS PRN
Status: DISCONTINUED | OUTPATIENT
Start: 2023-03-02 | End: 2023-03-02 | Stop reason: SDUPTHER

## 2023-03-02 RX ORDER — FENTANYL CITRATE 0.05 MG/ML
25 INJECTION, SOLUTION INTRAMUSCULAR; INTRAVENOUS EVERY 5 MIN PRN
Status: DISCONTINUED | OUTPATIENT
Start: 2023-03-02 | End: 2023-03-02 | Stop reason: HOSPADM

## 2023-03-02 RX ORDER — GLUCAGON 1 MG/ML
1 KIT INJECTION PRN
Status: DISCONTINUED | OUTPATIENT
Start: 2023-03-02 | End: 2023-03-02 | Stop reason: HOSPADM

## 2023-03-02 RX ORDER — LIDOCAINE HYDROCHLORIDE 20 MG/ML
INJECTION, SOLUTION INTRAVENOUS PRN
Status: DISCONTINUED | OUTPATIENT
Start: 2023-03-02 | End: 2023-03-02 | Stop reason: SDUPTHER

## 2023-03-02 RX ORDER — SODIUM CHLORIDE, SODIUM LACTATE, POTASSIUM CHLORIDE, CALCIUM CHLORIDE 600; 310; 30; 20 MG/100ML; MG/100ML; MG/100ML; MG/100ML
INJECTION, SOLUTION INTRAVENOUS CONTINUOUS
Status: DISCONTINUED | OUTPATIENT
Start: 2023-03-02 | End: 2023-03-02 | Stop reason: HOSPADM

## 2023-03-02 RX ORDER — ONDANSETRON 2 MG/ML
INJECTION INTRAMUSCULAR; INTRAVENOUS PRN
Status: DISCONTINUED | OUTPATIENT
Start: 2023-03-02 | End: 2023-03-02 | Stop reason: SDUPTHER

## 2023-03-02 RX ORDER — KETOROLAC TROMETHAMINE 30 MG/ML
30 INJECTION, SOLUTION INTRAMUSCULAR; INTRAVENOUS EVERY 6 HOURS
Status: DISCONTINUED | OUTPATIENT
Start: 2023-03-02 | End: 2023-03-02 | Stop reason: HOSPADM

## 2023-03-02 RX ORDER — OXYCODONE HYDROCHLORIDE 5 MG/1
10 TABLET ORAL EVERY 4 HOURS PRN
Status: DISCONTINUED | OUTPATIENT
Start: 2023-03-02 | End: 2023-03-02 | Stop reason: HOSPADM

## 2023-03-02 RX ORDER — SODIUM CHLORIDE 0.9 % (FLUSH) 0.9 %
5-40 SYRINGE (ML) INJECTION EVERY 12 HOURS SCHEDULED
Status: DISCONTINUED | OUTPATIENT
Start: 2023-03-02 | End: 2023-03-02 | Stop reason: HOSPADM

## 2023-03-02 RX ORDER — OXYCODONE HYDROCHLORIDE AND ACETAMINOPHEN 5; 325 MG/1; MG/1
2 TABLET ORAL EVERY EVENING
Qty: 10 TABLET | Refills: 0 | Status: SHIPPED | OUTPATIENT
Start: 2023-03-02 | End: 2023-03-07

## 2023-03-02 RX ORDER — SIMETHICONE 80 MG
80 TABLET,CHEWABLE ORAL 4 TIMES DAILY PRN
Qty: 180 TABLET | Refills: 1 | Status: SHIPPED | OUTPATIENT
Start: 2023-03-02

## 2023-03-02 RX ORDER — ACETAMINOPHEN 500 MG
1000 TABLET ORAL EVERY 8 HOURS PRN
Status: DISCONTINUED | OUTPATIENT
Start: 2023-03-02 | End: 2023-03-02 | Stop reason: HOSPADM

## 2023-03-02 RX ORDER — MAGNESIUM HYDROXIDE 1200 MG/15ML
LIQUID ORAL CONTINUOUS PRN
Status: DISCONTINUED | OUTPATIENT
Start: 2023-03-02 | End: 2023-03-02 | Stop reason: HOSPADM

## 2023-03-02 RX ORDER — KETOROLAC TROMETHAMINE 30 MG/ML
INJECTION, SOLUTION INTRAMUSCULAR; INTRAVENOUS PRN
Status: DISCONTINUED | OUTPATIENT
Start: 2023-03-02 | End: 2023-03-02 | Stop reason: SDUPTHER

## 2023-03-02 RX ORDER — ROPIVACAINE HYDROCHLORIDE 5 MG/ML
INJECTION, SOLUTION EPIDURAL; INFILTRATION; PERINEURAL
Status: DISCONTINUED | OUTPATIENT
Start: 2023-03-02 | End: 2023-03-02 | Stop reason: SDUPTHER

## 2023-03-02 RX ORDER — ONDANSETRON 2 MG/ML
4 INJECTION INTRAMUSCULAR; INTRAVENOUS EVERY 6 HOURS PRN
Status: DISCONTINUED | OUTPATIENT
Start: 2023-03-02 | End: 2023-03-02 | Stop reason: HOSPADM

## 2023-03-02 RX ORDER — DOCUSATE SODIUM 100 MG/1
100 CAPSULE, LIQUID FILLED ORAL 2 TIMES DAILY PRN
Qty: 60 CAPSULE | Refills: 2 | Status: SHIPPED | OUTPATIENT
Start: 2023-03-02

## 2023-03-02 RX ORDER — POLYETHYLENE GLYCOL 3350 17 G/17G
17 POWDER, FOR SOLUTION ORAL 2 TIMES DAILY PRN
Qty: 1020 G | Refills: 1 | Status: SHIPPED | OUTPATIENT
Start: 2023-03-02 | End: 2023-04-01

## 2023-03-02 RX ORDER — CLINDAMYCIN PHOSPHATE 900 MG/50ML
900 INJECTION INTRAVENOUS
Status: COMPLETED | OUTPATIENT
Start: 2023-03-02 | End: 2023-03-02

## 2023-03-02 RX ORDER — ONDANSETRON 2 MG/ML
4 INJECTION INTRAMUSCULAR; INTRAVENOUS
Status: DISCONTINUED | OUTPATIENT
Start: 2023-03-02 | End: 2023-03-02 | Stop reason: HOSPADM

## 2023-03-02 RX ORDER — ONDANSETRON 4 MG/1
4 TABLET, FILM COATED ORAL EVERY 6 HOURS PRN
Qty: 30 TABLET | Refills: 1 | Status: SHIPPED | OUTPATIENT
Start: 2023-03-02

## 2023-03-02 RX ORDER — MEPERIDINE HYDROCHLORIDE 25 MG/ML
12.5 INJECTION INTRAMUSCULAR; INTRAVENOUS; SUBCUTANEOUS EVERY 5 MIN PRN
Status: DISCONTINUED | OUTPATIENT
Start: 2023-03-02 | End: 2023-03-02 | Stop reason: HOSPADM

## 2023-03-02 RX ORDER — ONDANSETRON 4 MG/1
4 TABLET, ORALLY DISINTEGRATING ORAL EVERY 8 HOURS PRN
Status: DISCONTINUED | OUTPATIENT
Start: 2023-03-02 | End: 2023-03-02 | Stop reason: HOSPADM

## 2023-03-02 RX ORDER — OXYCODONE HYDROCHLORIDE 5 MG/1
5 TABLET ORAL PRN
Status: DISCONTINUED | OUTPATIENT
Start: 2023-03-02 | End: 2023-03-02 | Stop reason: HOSPADM

## 2023-03-02 RX ORDER — SODIUM CHLORIDE 9 MG/ML
25 INJECTION, SOLUTION INTRAVENOUS PRN
Status: DISCONTINUED | OUTPATIENT
Start: 2023-03-02 | End: 2023-03-02 | Stop reason: HOSPADM

## 2023-03-02 RX ORDER — SODIUM CHLORIDE 0.9 % (FLUSH) 0.9 %
5-40 SYRINGE (ML) INJECTION PRN
Status: DISCONTINUED | OUTPATIENT
Start: 2023-03-02 | End: 2023-03-02 | Stop reason: HOSPADM

## 2023-03-02 RX ORDER — IPRATROPIUM BROMIDE AND ALBUTEROL SULFATE 2.5; .5 MG/3ML; MG/3ML
1 SOLUTION RESPIRATORY (INHALATION)
Status: DISCONTINUED | OUTPATIENT
Start: 2023-03-02 | End: 2023-03-02 | Stop reason: HOSPADM

## 2023-03-02 RX ORDER — IBUPROFEN 800 MG/1
800 TABLET ORAL EVERY 8 HOURS PRN
Qty: 60 TABLET | Refills: 0 | Status: SHIPPED | OUTPATIENT
Start: 2023-03-02

## 2023-03-02 RX ADMIN — FENTANYL CITRATE 25 MCG: 50 INJECTION, SOLUTION INTRAMUSCULAR; INTRAVENOUS at 08:12

## 2023-03-02 RX ADMIN — DEXAMETHASONE SODIUM PHOSPHATE 8 MG: 4 INJECTION, SOLUTION INTRAMUSCULAR; INTRAVENOUS at 07:45

## 2023-03-02 RX ADMIN — PROPOFOL 200 MG: 10 INJECTION, EMULSION INTRAVENOUS at 07:39

## 2023-03-02 RX ADMIN — FENTANYL CITRATE 25 MCG: 0.05 INJECTION, SOLUTION INTRAMUSCULAR; INTRAVENOUS at 09:47

## 2023-03-02 RX ADMIN — GENTAMICIN SULFATE 139.2 MG: 40 INJECTION, SOLUTION INTRAMUSCULAR; INTRAVENOUS at 07:44

## 2023-03-02 RX ADMIN — ROCURONIUM BROMIDE 10 MG: 10 INJECTION, SOLUTION INTRAVENOUS at 08:15

## 2023-03-02 RX ADMIN — SODIUM CHLORIDE, POTASSIUM CHLORIDE, SODIUM LACTATE AND CALCIUM CHLORIDE: 600; 310; 30; 20 INJECTION, SOLUTION INTRAVENOUS at 06:30

## 2023-03-02 RX ADMIN — CLINDAMYCIN PHOSPHATE 900 MG: 900 INJECTION, SOLUTION INTRAVENOUS at 07:48

## 2023-03-02 RX ADMIN — FENTANYL CITRATE 25 MCG: 50 INJECTION, SOLUTION INTRAMUSCULAR; INTRAVENOUS at 09:04

## 2023-03-02 RX ADMIN — FENTANYL CITRATE 25 MCG: 50 INJECTION, SOLUTION INTRAMUSCULAR; INTRAVENOUS at 07:38

## 2023-03-02 RX ADMIN — FENTANYL CITRATE 25 MCG: 0.05 INJECTION, SOLUTION INTRAMUSCULAR; INTRAVENOUS at 09:54

## 2023-03-02 RX ADMIN — ROCURONIUM BROMIDE 50 MG: 10 INJECTION, SOLUTION INTRAVENOUS at 07:40

## 2023-03-02 RX ADMIN — KETOROLAC TROMETHAMINE 30 MG: 30 INJECTION, SOLUTION INTRAMUSCULAR; INTRAVENOUS at 08:53

## 2023-03-02 RX ADMIN — SODIUM CHLORIDE, POTASSIUM CHLORIDE, SODIUM LACTATE AND CALCIUM CHLORIDE: 600; 310; 30; 20 INJECTION, SOLUTION INTRAVENOUS at 07:20

## 2023-03-02 RX ADMIN — ROPIVACAINE HYDROCHLORIDE 60 ML: 5 INJECTION, SOLUTION EPIDURAL; INFILTRATION; PERINEURAL at 07:30

## 2023-03-02 RX ADMIN — ONDANSETRON 4 MG: 2 INJECTION INTRAMUSCULAR; INTRAVENOUS at 08:53

## 2023-03-02 RX ADMIN — LIDOCAINE HYDROCHLORIDE 40 MG: 20 INJECTION, SOLUTION INTRAVENOUS at 07:39

## 2023-03-02 RX ADMIN — FENTANYL CITRATE 25 MCG: 50 INJECTION, SOLUTION INTRAMUSCULAR; INTRAVENOUS at 08:56

## 2023-03-02 RX ADMIN — MIDAZOLAM HYDROCHLORIDE 2 MG: 1 INJECTION, SOLUTION INTRAMUSCULAR; INTRAVENOUS at 07:33

## 2023-03-02 RX ADMIN — SUGAMMADEX 200 MG: 100 INJECTION, SOLUTION INTRAVENOUS at 08:54

## 2023-03-02 ASSESSMENT — PAIN SCALES - GENERAL
PAINLEVEL_OUTOF10: 0
PAINLEVEL_OUTOF10: 6
PAINLEVEL_OUTOF10: 0
PAINLEVEL_OUTOF10: 5
PAINLEVEL_OUTOF10: 0
PAINLEVEL_OUTOF10: 6

## 2023-03-02 ASSESSMENT — PAIN - FUNCTIONAL ASSESSMENT: PAIN_FUNCTIONAL_ASSESSMENT: 0-10

## 2023-03-02 ASSESSMENT — PAIN DESCRIPTION - DESCRIPTORS: DESCRIPTORS: BURNING

## 2023-03-02 ASSESSMENT — PAIN DESCRIPTION - LOCATION: LOCATION: ABDOMEN

## 2023-03-02 NOTE — ANESTHESIA PROCEDURE NOTES
Peripheral Block    Patient location during procedure: pre-op  Reason for block: post-op pain management and at surgeon's request  Start time: 3/2/2023 7:30 AM  End time: 3/2/2023 7:40 AM  Staffing  Performed: anesthesiologist   Anesthesiologist: Griffin Henley MD  Preanesthetic Checklist  Completed: patient identified, IV checked, site marked, risks and benefits discussed, surgical/procedural consents, equipment checked, pre-op evaluation, timeout performed, anesthesia consent given, oxygen available and monitors applied/VS acknowledged  Peripheral Block   Patient position: supine  Prep: ChloraPrep  Provider prep: mask and sterile gloves (Sterile probe cover)  Patient monitoring: cardiac monitor, continuous pulse ox, frequent blood pressure checks and IV access  Block type: TAP  Laterality: bilateral  Injection technique: single-shot  Guidance: nerve stimulator and ultrasound guided  Local infiltration: ropivacaine  Infiltration strength: 0.25 %  Local infiltration: ropivacaine  Dose: 60 mL    Needle   Needle type: combined needle/nerve stimulator   Needle gauge: 22 G  Needle localization: anatomical landmarks and ultrasound guidance  Needle length: 5 cm  Assessment   Injection assessment: negative aspiration for heme, no paresthesia on injection and local visualized surrounding nerve on ultrasound  Paresthesia pain: immediately resolved  Slow fractionated injection: yes  Hemodynamics: stable  Real-time US image taken/store: yes    Additional Notes  Ultrasound image printed and saved in patient chart.     Sterile probe cover used    Medications Administered  ropivacaine (NAROPIN) injection 0.5% - Perineural   60 mL - 3/2/2023 7:30:00 AM

## 2023-03-02 NOTE — PROGRESS NOTES
Confirmation sent with sticker, timed and dated. Dr. Antoinetta Bee at bedside speaking with family and patient about procedure.

## 2023-03-02 NOTE — PROGRESS NOTES
CLINICAL PHARMACY NOTE: MEDS TO BEDS    Total # of Prescriptions Filled: 7   The following medications were delivered to the patient:  Oxycodone-Apap 5-325 mg Tab  Ibuprofen 800 mg Tab  Acetaminophen 500 mg Tab  Polyethylene Powder  Docusate 100 mg Cap  Ondansetron 4 mg Tab  Simethicone 80 mg Tab    Additional Documentation:,

## 2023-03-02 NOTE — PROGRESS NOTES
Discharge instructions and meds  reviewed with patient and boyfriend. All verbalized understanding of instructions and medications.

## 2023-03-02 NOTE — ANESTHESIA POSTPROCEDURE EVALUATION
Department of Anesthesiology  Postprocedure Note    Patient: Ness Pitts  MRN: [id number]  YOB: 1970  Date of evaluation: 3/2/2023      Procedure Summary     Date: 03/02/23 Room / Location: [address]    Anesthesia Start: 8555 Anesthesia Stop: 1519    Procedure: TOTAL LAPAROSCOPIC  HYSTERECTOMY, BILATERAL SALPINGECTOMY Diagnosis:       Endometrial polyp      Abnormal glandular Papanicolaou smear of cervix      (Endometrial polyp [N84.0])      (Abnormal glandular Papanicolaou smear of cervix [R87.619])    Surgeons: Gerline Peabody, MD Responsible Provider: Bhupinder Xiong MD    Anesthesia Type: general, regional ASA Status: 3          Anesthesia Type: No value filed.     Tyron Phase I: Tyron Score: 8    Tyron Phase II:        Anesthesia Post Evaluation    Patient location during evaluation: PACU  Patient participation: complete - patient participated  Level of consciousness: awake and alert  Airway patency: patent  Nausea & Vomiting: no nausea and no vomiting  Complications: no  Cardiovascular status: blood pressure returned to baseline and hemodynamically stable  Respiratory status: acceptable  Hydration status: euvolemic

## 2023-03-02 NOTE — H&P
Jess Lew is a 47 y.o. female who presents here today for complaints of Surgical Consult (Referred by Dr. Yun Murray.)    Endometrial cells on pap smear . Thickened endometrium with multiple uterine polyps on hysteroscopy. Heavy menses. EMB results : benign .          Vitals:  /82 (Site: Right Upper Arm, Position: Sitting, Cuff Size: Large Adult)   Pulse 76   Ht 5' 10\" (1.778 m)   Wt 288 lb (130.6 kg)   LMP 11/15/2022 (Approximate)   BMI 41.32 kg/m²   Allergies:  Penicillin g, Codeine, Demerol, Meperidine, and Penicillins  Past Medical History        Past Medical History:   Diagnosis Date    Hypothyroidism      Secondary hypertension 2021    Sleep apnea 2016     dr Tayler Delvalle, mild, c pap    Unspecified vitamin D deficiency           Past Surgical History         Past Surgical History:   Procedure Laterality Date    APPENDECTOMY        CARPAL TUNNEL RELEASE        CHOLECYSTECTOMY        DILATION AND CURETTAGE OF UTERUS             OB History            1    Para        Term   0            AB   1    Living   0           SAB   1    IAB        Ectopic        Molar        Multiple        Live Births                    Family History         Family History   Problem Relation Age of Onset    Breast Cancer Mother      Breast Cancer Sister           Social History               Socioeconomic History    Marital status:        Spouse name: Not on file    Number of children: Not on file    Years of education: Not on file    Highest education level: Not on file   Occupational History    Not on file   Tobacco Use    Smoking status: Never    Smokeless tobacco: Never   Vaping Use    Vaping Use: Never used   Substance and Sexual Activity    Alcohol use: No       Comment: occasional/rare    Drug use: No    Sexual activity: Yes       Partners: Male       Comment: vasectomy   Other Topics Concern    Not on file   Social History Narrative    Not on file      Social Determinants of Health Financial Resource Strain: Not on file   Food Insecurity: Not on file   Transportation Needs: Not on file   Physical Activity: Not on file   Stress: Not on file   Social Connections: Not on file   Intimate Partner Violence: Not on file   Housing Stability: Not on file            Contraceptive method:  none     Patient's medications, allergies, past medical, surgical, social and family histories were reviewed and updated as appropriate. Review of Systems  As per chief complaint   All other systems reviewed and are negative. Heavy vaginal bleeding . Physical Exam:  Vitals:  /82 (Site: Right Upper Arm, Position: Sitting, Cuff Size: Large Adult)   Pulse 76   Ht 5' 10\" (1.778 m)   Wt 288 lb (130.6 kg)   LMP 11/15/2022 (Approximate)   BMI 41.32 kg/m²   Lungs: CTAB   Heart : Regular S1/S2, no M/R/G  Abdomen: Soft , NT, ND , + BS   Pelvic exam : deferred     Assessment:        Diagnosis Orders   1. Endometrial cells on cervical Pap smear inconsistent w/LMP          2. Endometrial polyp                Plan:      Menorrhagia , multiple uterine polyps , risk factors for endomtrial cancer : obesity , polyps , , > 47 yo . Patient good candidate for definitive treatment :   [facility]   Counseling: The patient was counseled on all options both medical and surgical, conservative as well as definitive. She has elected to proceed with the procedure as stated above. The patient was counseled on the procedure. Risks and complications were reviewed in detail. The patients orders, labs, consents have been completed. The history and physical as well as all supporting surgical documentation will be forwarded to the pre-operative holding area. The patient is aware that this procedure may not alleviate her symptoms. That there may be a necessity for a second surgery and that there may be an incomplete removal of abnormal tissue.      Discussed all risks and benefits of procedure in detail including risks of anesthesia, blood loss, need for transfusion, infection;  also potential for complication, injury, need for repair and/or removal of uterus, tubes, ovaries, bowel, bladder, ureters, blood vessels and nerves. Also discussed pre-operative and post-operative expectations. Patient verbalizes understanding. Katie Whitlock M.D., FYU. G

## 2023-03-02 NOTE — ANESTHESIA PRE PROCEDURE
Department of Anesthesiology  Preprocedure Note       Name:  Karli Barnes   Age:  47 y.o.  :  1970                                          MRN:  [id number]         Date:  3/2/2023      Surgeon: Rebel Shaver):  Griffin Scott MD    Procedure: Procedure(s):  TOTAL LAPAROSCOPIC  HYSTERECTOMY    Medications prior to admission:   Prior to Admission medications    Medication Sig Start Date End Date Taking? Authorizing Provider   Cyanocobalamin (VITAMIN B 12 PO) Take by mouth daily 2 gummies    Historical Provider, MD   Multiple Vitamins-Minerals (WOMENS MULTI VITAMIN & MINERAL PO) Take 1 tablet by mouth daily    Historical Provider, MD   polyethylene glycol (MIRALAX) 17 GM/SCOOP powder Use ONE bottle in AM . 22   Jennifer Mccarty MD   clindamycin (CLEOCIN) 2 % vaginal cream USE one applicatorful vaginally nightly daily for 1 week prior to procedure 22   Griffin Scott MD   amLODIPine (NORVASC) 5 MG tablet Take 1 tablet by mouth daily 22   Meenu Arvizu MD   metoprolol tartrate (LOPRESSOR) 50 MG tablet Take 1 tablet by mouth 2 times daily 22   Meenu Arvizu MD   Cholecalciferol 50 MCG (2000 UT) CAPS Take 3,000 Units by mouth daily 19   Historical Provider, MD   pantoprazole (PROTONIX) 20 MG tablet Take 40 mg by mouth daily 8/27/18 3/2/23  Historical MD Antelmo   SYNTHROID 137 MCG tablet Daily 8/3/18   Historical Provider, MD       Current medications:    Current Facility-Administered Medications   Medication Dose Route Frequency Provider Last Rate Last Admin    clindamycin (CLEOCIN) 900 mg in dextrose 5 % 50 mL IVPB  900 mg IntraVENous On Call to 3424 Justin Tamayo MD        gentamicin (GARAMYCIN) 139.2 mg in sodium chloride 0.9 % 100 mL IVPB  1.5 mg/kg (Adjusted) IntraVENous On Call to 3424 Justin Tamayo MD        lactated ringers IV soln infusion   IntraVENous Continuous Shameka Martinez  mL/hr at 23 0630 New Bag at 23 0630       Allergies:     Allergies   Allergen Reactions    Penicillin G Anaphylaxis    Penicillins Anaphylaxis    Codeine Other (See Comments)     Heart palpitaions, fast    Meperidine Other (See Comments)     vomitting    Demerol Nausea And Vomiting       Problem List:    Patient Active Problem List   Diagnosis Code    Vitamin D deficiency E55.9    Hypothyroidism E03.9    Neck pain M54.2    Secondary hypertension I15.9    Endometrial polyp N84.0    Abnormal glandular Papanicolaou smear of cervix R87.619       Past Medical History:        Diagnosis Date    Hypothyroidism     Secondary hypertension 4/13/2021    Sleep apnea 03/2016    dr Tayler Delvalle, mild, c pap    Unspecified vitamin D deficiency        Past Surgical History:        Procedure Laterality Date    APPENDECTOMY      CARPAL TUNNEL RELEASE Right     CHOLECYSTECTOMY      DILATION AND CURETTAGE OF UTERUS      SHOULDER SURGERY Right     THUMB ARTHROSCOPY Left     WISDOM TOOTH EXTRACTION         Social History:    Social History     Tobacco Use    Smoking status: Never    Smokeless tobacco: Never   Substance Use Topics    Alcohol use: No     Comment: occasional/rare                                Counseling given: Not Answered      Vital Signs (Current):   Vitals:    03/02/23 0609 03/02/23 0643   BP: (!) 177/85    Pulse: 73    Resp: 14    Temp:  97.6 °F (36.4 °C)   TempSrc: Temporal    SpO2: 98%    Weight: 285 lb (129.3 kg)    Height: 5' 10\" (1.778 m)                                               BP Readings from Last 3 Encounters:   03/02/23 (!) 177/85   02/23/23 (!) 140/78   12/21/22 114/82       NPO Status: Time of last liquid consumption: 0430 (with b/p med)                        Time of last solid consumption: 2130                        Date of last liquid consumption: 03/02/23                        Date of last solid food consumption: 03/01/23    BMI:   Wt Readings from Last 3 Encounters:   03/02/23 285 lb (129.3 kg)   02/23/23 285 lb (129.3 kg)   12/21/22 288 lb (130.6 kg) Body mass index is 40.89 kg/m². CBC:   Lab Results   Component Value Date/Time    WBC 8.6 02/23/2023 04:40 PM    RBC 4.76 02/23/2023 04:40 PM    RBC 4.88 12/04/2018 11:00 AM    HGB 12.7 02/23/2023 04:40 PM    HCT 38.7 02/23/2023 04:40 PM    MCV 81.4 02/23/2023 04:40 PM    RDW 15.9 02/23/2023 04:40 PM     02/23/2023 04:40 PM       CMP:   Lab Results   Component Value Date/Time     02/23/2023 04:40 PM    K 3.9 02/23/2023 04:40 PM     02/23/2023 04:40 PM    CO2 24 02/23/2023 04:40 PM    BUN 15 02/23/2023 04:40 PM    CREATININE 1.03 02/23/2023 04:40 PM    GFRAA >60.0 05/14/2021 03:41 PM    AGRATIO 1.3 12/04/2018 11:00 AM    LABGLOM >60.0 02/23/2023 04:40 PM    GLUCOSE 90 02/23/2023 04:40 PM    GLUCOSE 90 06/13/2019 08:24 AM    PROT 6.8 05/14/2021 03:41 PM    CALCIUM 9.4 02/23/2023 04:40 PM    BILITOT 0.5 05/14/2021 03:41 PM    ALKPHOS 70 05/14/2021 03:41 PM    AST 16 05/14/2021 03:41 PM    ALT 12 05/14/2021 03:41 PM       POC Tests: No results for input(s): POCGLU, POCNA, POCK, POCCL, POCBUN, POCHEMO, POCHCT in the last 72 hours.     Coags:   Lab Results   Component Value Date/Time    PROTIME 13.9 02/23/2023 04:40 PM    INR 1.1 02/23/2023 04:40 PM    APTT 28.9 02/23/2023 04:40 PM       HCG (If Applicable):   Lab Results   Component Value Date    PREGSERUM Negative 12/18/2018        ABGs: No results found for: PHART, PO2ART, PEX7BNV, WSL1LVL, BEART, H2ZTQPRT     Type & Screen (If Applicable):  No results found for: LABABO, LABRH    Drug/Infectious Status (If Applicable):  No results found for: HIV, HEPCAB    COVID-19 Screening (If Applicable): No results found for: COVID19        Anesthesia Evaluation  Patient summary reviewed and Nursing notes reviewed no history of anesthetic complications:   Airway: Mallampati: II  TM distance: >3 FB   Neck ROM: full  Mouth opening: > = 3 FB   Dental: normal exam         Pulmonary:Negative Pulmonary ROS and normal exam    (+) sleep apnea: Cardiovascular:Negative CV ROS  Exercise tolerance: good (>4 METS),   (+) hypertension:,          Beta Blocker:  Not on Beta Blocker         Neuro/Psych:   Negative Neuro/Psych ROS              GI/Hepatic/Renal: Neg GI/Hepatic/Renal ROS  (+) morbid obesity          Endo/Other: Negative Endo/Other ROS   (+) hypothyroidism::., .          Pt had PAT visit. Abdominal:             Vascular: negative vascular ROS. Other Findings:           Anesthesia Plan      general and regional     ASA 3     (ETT)  Induction: intravenous. MIPS: Postoperative opioids intended and Prophylactic antiemetics administered. Anesthetic plan and risks discussed with patient. Plan discussed with CRNA.     Attending anesthesiologist reviewed and agrees with Pre Eval content                Newton Robles MD   3/2/2023

## 2023-03-02 NOTE — OP NOTE
Operative Report      Pre-operative Diagnosis: endometrial cells on pap , PMB     Post-operative Diagnosis: same     Operation: TLH with bilateral salpingectomies    Surgeon: Archana Ayala M.D     Anesthesia: GETA    Findings: endometriosis    Estimated Blood Loss:  less than 50 ml           Specimens: Uterus . Bilateral tubes. Complications:  None; patient tolerated the procedure well. Disposition: PACU - hemodynamically stable. Procedure Details      The patient was seen in the Holding Room. The risks, benefits, complications, treatment options, and expected outcomes were discussed with the patient. The patient concurred with the proposed plan, giving informed consent. The patient was taken to Operating Room,identified as name,  the procedure verified as TLH with bilateral salpingectomy . A Time Out was held and the above information confirmed. After induction of anesthesia, the patient was draped and prepped in the usual sterile manner. A Bell catheter was inserted into her bladder. Two retractors were placed into the vagina. A single tooth tenaculum was used to grasp the anterior lip of the cervix. The [employer] uterine manipulator  was placed and the retractors were removed. Attention was then turned to the abdomen into the supra-umbilical area. A 5 mm skin incision was made 1 cm above the umbilicus . A Veress needle was   then inserted. The initial pressure was 5mmhg . Pneumoperitoneum was created till 15mmhg of CO2 gas pressure was recorded on insuflator. The laparoscope was then inserted in the 5 mm trocar, and safe entry was confirmed under direct visualizaion through the trocar. Patient was then placed in -betancourt and a survey of the abdomen and pelvis was performed which revealed  normal ovaries bilaterally  . Ureters were identified bilaterally as well. At that point, decision was for three other trocars which were then inserted under direct visualization.    On the left side slightly below the level of the umbilicus and 8 cm lateral to the midline, a 12mm incision was made through which a 12 mm trocar was inserted, then on the same side approximately 15 cm lateral to the midline and at the level of the anterior superior iliac spine, a 5 mm incision was made through which a 5 mm trocar was inserted under direct visualization as well. On the right side, 12 cm lateral to the midline and 5 cm below the level of the umbilicus another 5 mm incision was made through which a 5 mm trocar was inserted. Attention was then towards the right and left fallopian tubes were then removed using the harmonic ace device cutting through mesosalpinx starting at the distal end of each and ending a the respective proximal tube attachment on each side. The right and left ovaries were preserved. Tubes were removed from the abdomen using laparoscopic graspers through the 12 mm port. Attention was then towards the left utero-ovarian ligament which was then grasped , coagulated and cut using the Harmonic ace device from [employer]. The bipolar cautery was also used on the field for any non hemostatic areas or bleeding pedicles and was used sequentially with the harmonic ace device to coagulate and then cut sequentially. After the left utero-ovarian ligament , then the left round ligament was coagulated and cut, followed by the anterior and posterior leaf of the broad ligament which was coagulated and cut multiple times all the way down to the uterine vessels on the left side. The anterior leaf of the broad ligament was dissected all the way anteriorly over the lower uterine segment on the left side through the bladder flap which was also dissected from that side as well in the same dissection plain. The posterior leaf of the broad ligament was also dissected posteriorly arround the edge of the V-care cup.  By completing the dissection of the broad ligament, the uterine vessels were skeletonized, i used the bipolar coagulator to grasp the vessels which then were coagulated multiple times, then the harmonic ace was used and the vessels were transected completely, hemostasis was intact. Same steps were carried out on the right side all the way down to the uterine vessels which were coagulated and cut in same fashion. The bladder flap dissection was then completed from the right side, and the bladder was then pushed down in the pelvis completely freeing the anterior lower uterine segment and anterior vaginal cuff border. The v-care cup edge was then seen clearly arround the vaginal cuff. Uterus was noted to have blanched at that time. I then used the monopolar hook to cut through the vaginal cuff surrounding the cervix and uterus creating a colpotomy, arround the edge of the v-care cup used as guidance , pushing up on the v-care cup during the procedure to prevent any ureteral injury. After the uterus and cervix were completely freed from surrounding vaginal tissue, the uterus was exteriorized vaginally. The vaginal cuff was then closed using 0-Stratifix locking suture in a continous fashion. Care was taken to incorporate distal portion of uterosacral ligaments with the cuff closure to prevent future prolapse. Irrigation was then carried out and pedicles rechecked and found to be hemostatic. The 12mm facial incision was approximated using the endo loop closure device with 0-vicryl suture utilized. At that point gas was emptied , All  the instruments were removed from the abdomen. The counts were correct . The skin incisions were closed with a subcuticular fashion and Dermabond. She was sent to the recovery room in good condition. Instrument, sponge, and needle counts were correct prior to  closure and at the conclusion of the case. Nir Arias M.D., FLACEYO. G

## 2023-03-06 DIAGNOSIS — I15.9 SECONDARY HYPERTENSION: ICD-10-CM

## 2023-03-09 RX ORDER — AMLODIPINE BESYLATE 5 MG/1
5 TABLET ORAL DAILY
Qty: 90 TABLET | Refills: 3 | Status: SHIPPED | OUTPATIENT
Start: 2023-03-09

## 2023-03-09 RX ORDER — METOPROLOL TARTRATE 50 MG/1
50 TABLET, FILM COATED ORAL 2 TIMES DAILY
Qty: 120 TABLET | Refills: 5 | Status: SHIPPED | OUTPATIENT
Start: 2023-03-09

## 2023-03-15 ENCOUNTER — TELEPHONE (OUTPATIENT)
Dept: OBGYN CLINIC | Age: 53
End: 2023-03-15

## 2023-03-15 ENCOUNTER — OFFICE VISIT (OUTPATIENT)
Dept: OBGYN CLINIC | Age: 53
End: 2023-03-15

## 2023-03-15 VITALS
HEIGHT: 70 IN | HEART RATE: 76 BPM | DIASTOLIC BLOOD PRESSURE: 80 MMHG | SYSTOLIC BLOOD PRESSURE: 122 MMHG | WEIGHT: 284 LBS | BODY MASS INDEX: 40.66 KG/M2

## 2023-03-15 DIAGNOSIS — Z09 POSTOPERATIVE EXAMINATION: Primary | ICD-10-CM

## 2023-03-15 PROCEDURE — 99024 POSTOP FOLLOW-UP VISIT: CPT | Performed by: OBSTETRICS & GYNECOLOGY

## 2023-03-15 SDOH — ECONOMIC STABILITY: FOOD INSECURITY: WITHIN THE PAST 12 MONTHS, YOU WORRIED THAT YOUR FOOD WOULD RUN OUT BEFORE YOU GOT MONEY TO BUY MORE.: NEVER TRUE

## 2023-03-15 SDOH — ECONOMIC STABILITY: FOOD INSECURITY: WITHIN THE PAST 12 MONTHS, THE FOOD YOU BOUGHT JUST DIDN'T LAST AND YOU DIDN'T HAVE MONEY TO GET MORE.: NEVER TRUE

## 2023-03-15 SDOH — ECONOMIC STABILITY: INCOME INSECURITY: HOW HARD IS IT FOR YOU TO PAY FOR THE VERY BASICS LIKE FOOD, HOUSING, MEDICAL CARE, AND HEATING?: NOT HARD AT ALL

## 2023-03-15 SDOH — ECONOMIC STABILITY: HOUSING INSECURITY
IN THE LAST 12 MONTHS, WAS THERE A TIME WHEN YOU DID NOT HAVE A STEADY PLACE TO SLEEP OR SLEPT IN A SHELTER (INCLUDING NOW)?: NO

## 2023-03-15 ASSESSMENT — PATIENT HEALTH QUESTIONNAIRE - PHQ9
SUM OF ALL RESPONSES TO PHQ QUESTIONS 1-9: 0
SUM OF ALL RESPONSES TO PHQ QUESTIONS 1-9: 0
1. LITTLE INTEREST OR PLEASURE IN DOING THINGS: 0
2. FEELING DOWN, DEPRESSED OR HOPELESS: 0
SUM OF ALL RESPONSES TO PHQ QUESTIONS 1-9: 0
SUM OF ALL RESPONSES TO PHQ9 QUESTIONS 1 & 2: 0
SUM OF ALL RESPONSES TO PHQ QUESTIONS 1-9: 0

## 2023-03-15 NOTE — PROGRESS NOTES
Encounter Date: 10/25/2017    SCRIBE #1 NOTE: I, Mey Lr, am scribing for, and in the presence of,  Dr. Albrecht. I have scribed the entire note.       History     Chief Complaint   Patient presents with    Hip Injury     slipped and fell 1 hr PTA,no loc, ,  left leg shortening and outwward rotation.  Usually walks w/ walker Left elbow tear.      Patient is a 89 y/o female with a past medical hx of osteoporosis presenting after a fall at 3:30pm today. Pt's  called their daughter to the room after the patient fell. The fall was non witnessed. The daughter reports that she found the Pt against the wall with her left ankle crossed over the right. The Pt reports that she took her hands off of her walker to reach for an object and then slipped. She was unable to stand after the fall. Pt denies dizziness or light-headedness prior to the fall. No head injury or loss of consciousness noted.  Pt reports inability to bend the left knee or rotate the hip. Pt also reports an abrasion to the left elbow. Pt takes a baby ASA 81 mg daily.       The history is provided by the patient, a relative and medical records.     Review of patient's allergies indicates:  No Known Allergies  Past Medical History:   Diagnosis Date    Arthritis     Cataract     Dysphagia, unspecified(367.20)     Glaucoma     Hyperlipidemia     MGUS (monoclonal gammopathy of unknown significance)     Osteoporosis      Past Surgical History:   Procedure Laterality Date    BACK SURGERY      CHOLECYSTECTOMY      EYE SURGERY Bilateral     CATARACT SURGERY    GALLBLADDER SURGERY      SPINE SURGERY       Family History   Problem Relation Age of Onset    Stroke Mother     Heart disease Father     Cancer Sister     Cancer Brother      ? prostate    No Known Problems Daughter     No Known Problems Son     No Known Problems Son     No Known Problems Son     No Known Problems Son     No Known Problems Daughter     Colon cancer Neg  Postop Progress Note    Dallas Shook presents to the office for postop follow up. Objective    Vitals:    03/15/23 1143   BP: 122/80   Pulse: 76     General: alert, cooperative and no distress  Incision: healing well    Assessment  Doing well postoperatively. Plan  1. Continue any current medications  2. Wound care discussed  3. Pt is to increase activities as tolerated  4. Usual diet  5.  Follow up: as needed    Electronically signed by Sarah Mitchell MD on 3/15/2023 at 1:04 PM Hx     Cystic fibrosis Neg Hx     Esophageal cancer Neg Hx     Hemochromatosis Neg Hx     Inflammatory bowel disease Neg Hx     Liver cancer Neg Hx     Liver disease Neg Hx     Rectal cancer Neg Hx     Stomach cancer Neg Hx     Derrick's disease Neg Hx     Celiac disease Neg Hx      Social History   Substance Use Topics    Smoking status: Current Every Day Smoker     Packs/day: 1.00     Years: 60.00    Smokeless tobacco: Never Used    Alcohol use No     Review of Systems   Musculoskeletal:        Positive for left hip pain and inability to bend left knee.    Skin: Positive for wound (abrasion left hand).   Neurological: Negative for dizziness, syncope, light-headedness and headaches.   All other systems reviewed and are negative.      Physical Exam     Initial Vitals [10/25/17 1618]   BP Pulse Resp Temp SpO2   (!) 143/66 75 18 97.9 °F (36.6 °C) 95 %      MAP       91.67         Physical Exam     PHYSICAL EXAM:  Vital signs and nursing assessment noted:    GEN:   Moderate distress secondary to pain, Alert,  well appearing, nontoxic appearing, smells like urine  HEENT:  PERRLA, EOMI, moist membranes, nl conjunctiva, no scleral icterus, no nystagmus, no nodes/nodules, soft, supple, FROM, no trachial deviation  CV:   RRR no m/r/g, 2+ radial pulses, <2sec cap refill, no obvious JVP  RESP:  CTA B, no w/r/r, equal and bilateral chest rise, no respiratory distress  ABD:   soft, Nontender, Nondistended, +BS, no guarding/rebound  BACK:  FROM, no midline tenderness, no paraspinal tenderness  :   Deferred  EXT:   no edema, no calf tenderness, no swelling, decreased ROM of her pelvis, pain with ROM of left hip, tenderness to left hip.   LYMPH:  no gross adenopathy  NEURO:  CN II-XII grossly intact, no obvious motor/sensory deficit, no tremor, gait not tested secondary to pain  PSYCH:   KASSIDY  SKIN:  Warm, dry, intact, no rashes or masses, nl color, no pallor, superficial abrasion left elbow       ED Course    Procedures  Labs Reviewed   BASIC METABOLIC PANEL - Abnormal; Notable for the following:        Result Value    CO2 34 (*)     Glucose 66 (*)     All other components within normal limits   CBC W/ AUTO DIFFERENTIAL - Abnormal; Notable for the following:     MCHC 31.5 (*)     All other components within normal limits   URINALYSIS, REFLEX TO URINE CULTURE - Abnormal; Notable for the following:     Occult Blood UA 1+ (*)     Leukocytes, UA 1+ (*)     All other components within normal limits    Narrative:     Preferred Collection Type->Urine, Clean Catch   URINALYSIS MICROSCOPIC - Abnormal; Notable for the following:     RBC, UA 14 (*)     WBC, UA 7 (*)     All other components within normal limits    Narrative:     Preferred Collection Type->Urine, Clean Catch   PROTIME-INR     EKG Readings: (Independently Interpreted)   1704   Sinus rhythm, rate of 73, artefact present, LBBB.  1836   NSR at a rate of 79. Prolonged QT. Incomplete LBBB. RAD. Nonspecific ST changes.          Medical Decision Making:   History:   Old Medical Records: I decided to obtain old medical records.  Old Records Summarized: other records.       <> Summary of Records: Seen 2 weeks ago for fall with injury to left foot  Initial Assessment:   89F with frequent falls presents with mechanical fall and moderate left hip pain.  Deformity noted on left hip  Differential Diagnosis:   Ext pain differential includes but not exclusive to: sprain/strain, fracture, contusion, hematoma, arthritis,     Independently Interpreted Test(s):   I have ordered and independently interpreted X-rays - see summary below.       <> Summary of X-Ray Reading(s): Hip: Left comminuted intertochanteric fx  I have ordered and independently interpreted EKG Reading(s) - see prior notes  Clinical Tests:   Lab Tests: Ordered and Reviewed  The following lab test(s) were unremarkable: CBC       <> Summary of Lab: Mild hypoglycemia  Radiological Study: Ordered and Reviewed  Medical  Tests: Ordered and Reviewed  ED Management:  Treatment plan includes physical exam, cardiac monitoring, labs, imaging studies, pain control, IVF and supportive care.    Patient improved after treatment  No dysuria, fever or leucocytosis.  Family and patient updated on care.  Pt and family agrees with assessment, disposition and treatment plan and has no further questions or complaints at this time. Demonstrates understanding.  Patient alert during entire observation in Ed.   Further plan per inpatient team    Called up to nursing floor to insure that patient receives further nutrition tonight  Other:   I have discussed this case with another health care provider.       <> Summary of the Discussion: 1700 ortho consulted.            Scribe Attestation:   Scribe #1: I performed the above scribed service and the documentation accurately describes the services I performed. I attest to the accuracy of the note.    Attending Attestation:           Physician Attestation for Scribe:      Comments: I, Dr. Gurpreet Albrecht, personally performed the services described in this documentation. All medical record entries made by the scribe were at my direction and in my presence.  I have reviewed the chart and agree that the record reflects my personal performance and is accurate and complete. Gurpreet Albrecht MD.  8:11 PM 10/25/2017              ED Course      Clinical Impression:   The primary encounter diagnosis was Closed fracture of left hip, initial encounter. Diagnoses of Pain and Fall were also pertinent to this visit.    Disposition:   Disposition: Admitted  Condition: Stable                        Gurpreet Albrecht MD  10/25/17 2026       Gurpreet Albrecht MD  10/25/17 2042

## 2023-03-21 ENCOUNTER — TELEPHONE (OUTPATIENT)
Dept: OBGYN CLINIC | Age: 53
End: 2023-03-21

## 2023-04-26 ENCOUNTER — OFFICE VISIT (OUTPATIENT)
Dept: OBGYN CLINIC | Age: 53
End: 2023-04-26

## 2023-04-26 VITALS
HEIGHT: 70 IN | SYSTOLIC BLOOD PRESSURE: 116 MMHG | HEART RATE: 80 BPM | BODY MASS INDEX: 40.8 KG/M2 | WEIGHT: 285 LBS | DIASTOLIC BLOOD PRESSURE: 82 MMHG

## 2023-04-26 DIAGNOSIS — Z09 POSTOPERATIVE EXAMINATION: ICD-10-CM

## 2023-04-26 DIAGNOSIS — R23.2 HOT FLASHES: Primary | ICD-10-CM

## 2023-04-26 PROCEDURE — 99024 POSTOP FOLLOW-UP VISIT: CPT | Performed by: OBSTETRICS & GYNECOLOGY

## 2023-04-26 NOTE — PROGRESS NOTES
Postop Progress Note    Subjective    Nita Rhodes presents to the office for postop follow up. Objective    Vitals:    04/26/23 1115   BP: 116/82   Pulse: 80     General: alert, cooperative and no distress  Incision: healing well    Assessment  Doing well postoperatively. Plan  1. Continue any current medications  2. Wound care discussed  3. Pt is to increase activities as tolerated  4. Usual diet  5.  Follow up: as needed    Electronically signed by William Wright MD on 4/26/2023 at 2:56 PM

## 2023-06-20 ENCOUNTER — OFFICE VISIT (OUTPATIENT)
Dept: PRIMARY CARE CLINIC | Age: 53
End: 2023-06-20
Payer: COMMERCIAL

## 2023-06-20 VITALS
RESPIRATION RATE: 18 BRPM | HEART RATE: 85 BPM | WEIGHT: 287.6 LBS | TEMPERATURE: 97.5 F | HEIGHT: 70 IN | BODY MASS INDEX: 41.17 KG/M2 | OXYGEN SATURATION: 100 % | SYSTOLIC BLOOD PRESSURE: 124 MMHG | DIASTOLIC BLOOD PRESSURE: 62 MMHG

## 2023-06-20 DIAGNOSIS — R05.2 SUBACUTE COUGH: ICD-10-CM

## 2023-06-20 DIAGNOSIS — I15.9 SECONDARY HYPERTENSION: ICD-10-CM

## 2023-06-20 DIAGNOSIS — J01.90 SUBACUTE SINUSITIS, UNSPECIFIED LOCATION: Primary | ICD-10-CM

## 2023-06-20 DIAGNOSIS — G47.33 OSA (OBSTRUCTIVE SLEEP APNEA): ICD-10-CM

## 2023-06-20 PROCEDURE — 99214 OFFICE O/P EST MOD 30 MIN: CPT | Performed by: INTERNAL MEDICINE

## 2023-06-20 RX ORDER — BENZONATATE 100 MG/1
100-200 CAPSULE ORAL 3 TIMES DAILY PRN
Qty: 30 CAPSULE | Refills: 0 | Status: SHIPPED | OUTPATIENT
Start: 2023-06-20

## 2023-06-20 RX ORDER — AZITHROMYCIN 250 MG/1
TABLET, FILM COATED ORAL
Qty: 1 PACKET | Refills: 0 | Status: SHIPPED | OUTPATIENT
Start: 2023-06-20

## 2023-06-20 RX ORDER — MOLNUPIRAVIR 200 MG/1
CAPSULE ORAL
COMMUNITY
Start: 2023-05-30

## 2023-06-20 RX ORDER — GUAIFENESIN 600 MG/1
600 TABLET, EXTENDED RELEASE ORAL 2 TIMES DAILY
Qty: 30 TABLET | Refills: 0 | Status: SHIPPED | OUTPATIENT
Start: 2023-06-20 | End: 2023-07-05

## 2023-06-20 RX ORDER — METOPROLOL TARTRATE 50 MG/1
50 TABLET, FILM COATED ORAL 2 TIMES DAILY
Qty: 120 TABLET | Refills: 5 | Status: CANCELLED | OUTPATIENT
Start: 2023-06-20

## 2023-06-20 ASSESSMENT — ENCOUNTER SYMPTOMS
DIARRHEA: 0
COUGH: 1
SORE THROAT: 0
SHORTNESS OF BREATH: 0
SINUS PAIN: 1
WHEEZING: 0
RHINORRHEA: 0
VOMITING: 0
NAUSEA: 0
ABDOMINAL PAIN: 0
SINUS PRESSURE: 1

## 2023-06-20 NOTE — PROGRESS NOTES
Subjective:      Patient ID: Kuldip Gottlieb is a 47 y.o. female    Hypertension f/u  HPI  Pt presents for follow up regarding treatment of hypertension. Currently on amlodipine and lopressor with satisfactory BP control. No lightheadedness or GI side effects. No chest pain or SOB, no one sided weakness or slurred speech. Needs order for CPAP face mask and filter. Tested positive for COVID 1 month ago. Sinus congestion, dry cough remain persistent. No fever or chills, no SOB or wheezing.   Past Medical History:   Diagnosis Date    Hypothyroidism     Secondary hypertension 4/13/2021    Sleep apnea 03/2016    dr Kevan Miner, mild, c pap    Unspecified vitamin D deficiency      Past Surgical History:   Procedure Laterality Date    APPENDECTOMY      CARPAL TUNNEL RELEASE Right     CHOLECYSTECTOMY      DILATION AND CURETTAGE OF UTERUS      HYSTERECTOMY (CERVIX STATUS UNKNOWN)      HYSTERECTOMY, VAGINAL N/A 03/02/2023    TOTAL LAPAROSCOPIC  HYSTERECTOMY, BILATERAL SALPINGECTOMY performed by Danni Sigala MD at [place] [address] Right     THUMB ARTHROSCOPY Left     WISDOM TOOTH EXTRACTION       Social History     Socioeconomic History    Marital status:      Spouse name: Not on file    Number of children: Not on file    Years of education: Not on file    Highest education level: Not on file   Occupational History    Not on file   Tobacco Use    Smoking status: Never    Smokeless tobacco: Never   Vaping Use    Vaping Use: Never used   Substance and Sexual Activity    Alcohol use: No     Comment: occasional/rare    Drug use: Never    Sexual activity: Yes     Partners: Male     Comment: vasectomy   Other Topics Concern    Not on file   Social History Narrative    Not on file     Social Determinants of Health     Financial Resource Strain: Low Risk     Difficulty of Paying Living Expenses: Not hard at all   Food Insecurity: No Food Insecurity    Worried About Running Out of Food in the

## 2023-11-10 ENCOUNTER — OFFICE VISIT (OUTPATIENT)
Dept: PRIMARY CARE CLINIC | Age: 53
End: 2023-11-10
Payer: COMMERCIAL

## 2023-11-10 VITALS
OXYGEN SATURATION: 100 % | SYSTOLIC BLOOD PRESSURE: 124 MMHG | TEMPERATURE: 97.5 F | WEIGHT: 281 LBS | HEART RATE: 77 BPM | RESPIRATION RATE: 18 BRPM | HEIGHT: 70 IN | DIASTOLIC BLOOD PRESSURE: 62 MMHG | BODY MASS INDEX: 40.23 KG/M2

## 2023-11-10 DIAGNOSIS — N94.10 DYSPAREUNIA IN FEMALE: ICD-10-CM

## 2023-11-10 DIAGNOSIS — M54.50 CHRONIC MIDLINE LOW BACK PAIN WITHOUT SCIATICA: Primary | ICD-10-CM

## 2023-11-10 DIAGNOSIS — G89.29 CHRONIC MIDLINE LOW BACK PAIN WITHOUT SCIATICA: Primary | ICD-10-CM

## 2023-11-10 PROCEDURE — 99214 OFFICE O/P EST MOD 30 MIN: CPT | Performed by: INTERNAL MEDICINE

## 2023-11-10 RX ORDER — GLYCERIN/PROPYLENE GLYCOL/WATR
SOLUTION, NON-ORAL VAGINAL
Qty: 1 EACH | Refills: 0 | Status: SHIPPED | OUTPATIENT
Start: 2023-11-10

## 2023-11-10 RX ORDER — TIZANIDINE 4 MG/1
4 TABLET ORAL 3 TIMES DAILY PRN
Qty: 20 TABLET | Refills: 0 | Status: SHIPPED | OUTPATIENT
Start: 2023-11-10

## 2023-11-10 RX ORDER — IBUPROFEN 600 MG/1
600 TABLET ORAL 3 TIMES DAILY PRN
Qty: 30 TABLET | Refills: 0 | Status: SHIPPED | OUTPATIENT
Start: 2023-11-10

## 2023-11-10 NOTE — PROGRESS NOTES
Subjective:      Patient ID: Mahlon Cowden is a 46 y.o. female      Low back pain x 3 months   HPI  Pt presents with 3 months of pain in the middle of the lower back: achy intermittent rated 4/10, nonradiating, relieved with APAP. No tingling or numbness. Assoc hip weakness. No fever or chills, no bowel. 6/20/2023 11/10/2023   Vitals     Weight - Scale 287 lb 9.6 oz  281 lb    Height 5' 10\"  5' 10\"    Body Mass Index 41.27 kg/m2 (H)  40.32 kg/m2 (H)       Painful intercourse x 7 months. Vaginal dryness. Post hysterectomy 8 months ago. No vaginal discharge, no itch or rash.    Past Medical History:   Diagnosis Date    Hypothyroidism     Secondary hypertension 4/13/2021    Sleep apnea 03/2016    dr Sandra Raymond, mild, c pap    Unspecified vitamin D deficiency      Past Surgical History:   Procedure Laterality Date    APPENDECTOMY      CARPAL TUNNEL RELEASE Right     CHOLECYSTECTOMY      DILATION AND CURETTAGE OF UTERUS      HYSTERECTOMY (CERVIX STATUS UNKNOWN)      HYSTERECTOMY, VAGINAL N/A 03/02/2023    TOTAL LAPAROSCOPIC  HYSTERECTOMY, BILATERAL SALPINGECTOMY performed by Franchesca Crum MD at [address] [place] [address] Right     THUMB ARTHROSCOPY Left     WISDOM TOOTH EXTRACTION       Social History     Socioeconomic History    Marital status:      Spouse name: Not on file    Number of children: Not on file    Years of education: Not on file    Highest education level: Not on file   Occupational History    Not on file   Tobacco Use    Smoking status: Never    Smokeless tobacco: Never   Vaping Use    Vaping Use: Never used   Substance and Sexual Activity    Alcohol use: No     Comment: occasional/rare    Drug use: Never    Sexual activity: Yes     Partners: Male     Comment: vasectomy   Other Topics Concern    Not on file   Social History Narrative    Not on file     Social Determinants of Health     Financial Resource Strain: Low Risk  (3/15/2023)    Overall Financial Resource Strain

## 2023-11-11 DIAGNOSIS — I15.9 SECONDARY HYPERTENSION: ICD-10-CM

## 2023-11-11 DIAGNOSIS — R23.2 HOT FLASHES: ICD-10-CM

## 2023-11-11 LAB
ANION GAP SERPL CALCULATED.3IONS-SCNC: 11 MEQ/L (ref 9–15)
BUN SERPL-MCNC: 15 MG/DL (ref 6–20)
CALCIUM SERPL-MCNC: 9.2 MG/DL (ref 8.5–9.9)
CHLORIDE SERPL-SCNC: 106 MEQ/L (ref 95–107)
CHOLEST SERPL-MCNC: 150 MG/DL (ref 0–199)
CO2 SERPL-SCNC: 25 MEQ/L (ref 20–31)
CREAT SERPL-MCNC: 0.87 MG/DL (ref 0.5–0.9)
FOLLICLE STIMULATING HORMONE: 44 MIU/ML
GLUCOSE SERPL-MCNC: 90 MG/DL (ref 70–99)
HDLC SERPL-MCNC: 50 MG/DL (ref 40–59)
LDLC SERPL CALC-MCNC: 88 MG/DL (ref 0–129)
POTASSIUM SERPL-SCNC: 3.8 MEQ/L (ref 3.4–4.9)
SODIUM SERPL-SCNC: 142 MEQ/L (ref 135–144)
TRIGL SERPL-MCNC: 58 MG/DL (ref 0–150)

## 2023-11-11 ASSESSMENT — ENCOUNTER SYMPTOMS
SORE THROAT: 0
RHINORRHEA: 0
COUGH: 0
SINUS PRESSURE: 0
DIARRHEA: 0
ABDOMINAL PAIN: 0
VOMITING: 0
WHEEZING: 0
NAUSEA: 0
BACK PAIN: 1
SHORTNESS OF BREATH: 0
SINUS PAIN: 0

## 2023-12-27 ENCOUNTER — HOSPITAL ENCOUNTER (OUTPATIENT)
Dept: PHYSICAL THERAPY | Age: 53
Setting detail: THERAPIES SERIES
Discharge: HOME | End: 2023-12-27
Attending: INTERNAL MEDICINE
Payer: COMMERCIAL

## 2023-12-27 PROCEDURE — 97161 PT EVAL LOW COMPLEX 20 MIN: CPT

## 2023-12-27 NOTE — PROGRESS NOTES
[id number] [facility]     Ph: [phone]  Fax: [phone]    [] Certification  [] Recertification [x]  Plan of Care  [] Progress Note [] Discharge      Referring Provider: Nadine Cruz MD    From:  Beth Burger, PT , DPT    Patient: Pilar Raymundo (46 y.o. female) : 1970 Date: 2023   Medical Diagnosis: Chronic midline low back pain without sciatica [M54.50, G89.29] Diagnosis: Chronic midline low back pain without sciatica  Treatment Diagnosis: decreased bilateral LE & core & trunk strength, decreased functional strength & endurance, decreased ability to perform functional mobility tasks & ADLs, decreased postural awareness/musculature strength, & increased low back pain with intermittent anterior thigh numbness    Plan of Care/Certification Expiration Date:     Progress Report Period from: 2023  to 2023    Visits to Date: 1 No Show: 0 Cancelled Appts: 0    OBJECTIVE: Long Term Goals - Time Frame for Long Term Goals : 4-6 weeks  Goals Current/ Discharge status Status   Long Term Goal 1: Patient will be independent/compliant with PT HEP in order to demonstrate ability to self manage symptoms upon discharge. LTG 1 Current Status[de-identified] 23: to be initiated next session   New   Long Term Goal 2: Patient will improve core & trunk & bilateral LE strength >/=4+/5 in order to demonstrate improved ability to perform functional mobility tasks & ADLs.   R Middle Trapezius: 3+/5  R Rhomboids: 3+/5  R Lower Trapezius: 3+/5  Strength RLE  R Hip Flexion: 4+/5  R Hip Extension: 4/5  R Hip ABduction: 4/5  R Hip ADduction: 4-/5  R Hip Internal Rotation: 5/5  R Hip External Rotation: 5/5  R Knee Flexion: 4/5  R Knee Extension: 5/5  R Ankle Dorsiflexion: 5/5  R Latissimus Dorsi: 3+/5     Trunk Strength  Trunk Flexion: 3-/5  Trunk Extension: 3-/5  Upper abdominals: Poor  Lower abdominals: Poor New
Codeine, Meperidine, and Demerol      SUBJECTIVE EXAMINATION     History obtained from[de-identified] Patient, Chart Review,      Family/Caregiver Present: No    Subjective History: Onset Date:  (August 2023)  Subjective: Patient reports on-going low back pain that has been present since August 2023 with no specific DEBBIE. Patient reports getting muscle relaxor & prescription Tylenol which help with pain. Patient reports bilateral low back pain with recent anterior thigh numbness, 4xs since MD appointment, usually when standing. Patient reports that she used to be a stomach sleeper, then changed to sides, now sleeps on back. Patient denies N/T other than anterior thigh pain. Patient denies regular exercise, walks. Patient reports decreased standing & walking distances. Patient denies recent falls. Patient reports sedentary job. Patient reports difficulty transitioning. Patient reports utilizing heated seats in car. Patient denies any back surgeries. Patient reports non-reciprocal stair pattern. Patient reports trialing different office chairs as well as shoes.   Additional Pertinent Hx (if applicable): acid reflux, thyroid disease, HTN   Prior diagnostic testing[de-identified] X-ray (IMPRESSION:  No acute osseous abnormality.)  Comments: RTD = TBD; PLOF = 100%; CLOF = 50%      Learning/Language: Learning  Does the patient/guardian have any barriers to learning?: No barriers  Will there be a co-learner?: No  What is the preferred language of the patient/guardian?: English  Is an  required?: No  How does the patient/guardian prefer to learn new concepts?: Listening, Reading, Demonstration     Pain Screening    Pain Screening  Patient Currently in Pain: Denies (0-4/10 pain in back ranges)    Functional Status    Social History:    Social History  Lives With: Alone  Type of Home: House  Home Layout: One level, Laundry in basement  Home Access: Stairs to enter with rails  Entrance Stairs - Rails: Left  Entrance Stairs -

## 2024-01-04 ENCOUNTER — HOSPITAL ENCOUNTER (OUTPATIENT)
Dept: PHYSICAL THERAPY | Age: 54
Setting detail: THERAPIES SERIES
Discharge: HOME | End: 2024-01-04
Attending: INTERNAL MEDICINE
Payer: COMMERCIAL

## 2024-01-04 PROCEDURE — 97110 THERAPEUTIC EXERCISES: CPT

## 2024-01-04 NOTE — PROGRESS NOTES
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 Strengthening, ROM, Balance training, Gait training, Stair training, Functional mobility training, Neuromuscular re-education, IADL training, ADL/Self-care training, Patient/Caregiver education & training, Safety education & training, Home exercise program, Therapeutic activities, Pain management, Endurance training, Modalities, Positioning, Manual  Modalities: Heat/Cold, Mechanical Traction, E-stim - unattended, Ultrasound  Additional Comments: Transfer PT POC to [facility], PT  Pt to continue current HEP.  See objective section for any therapeutic exercise changes, additions or modifications this date.    Therapy Time:      PT Individual Minutes  Time In: 1603  Time Out: 1643  Minutes: 40  Timed Code Treatment Minutes: 40 Minutes  Procedure Minutes:0  Timed Activity Minutes Units   Ther Ex 40 3     Electronically signed by Jana Turk PTA on 1/4/24 at 4:59 PM EST

## 2024-01-08 ENCOUNTER — HOSPITAL ENCOUNTER (OUTPATIENT)
Dept: PHYSICAL THERAPY | Age: 54
Setting detail: THERAPIES SERIES
Discharge: HOME | End: 2024-01-08
Attending: INTERNAL MEDICINE
Payer: COMMERCIAL

## 2024-01-08 PROCEDURE — 97110 THERAPEUTIC EXERCISES: CPT

## 2024-01-08 NOTE — PROGRESS NOTES
[facility]  Outpatient Physical Therapy   Treatment Note        Date: 2024  Patient: Isabella Lara  : 1970   Confirmed: Yes  MRN: [id number]  Referring Provider: Evi Gamino MD      Medical Diagnosis: Chronic midline low back pain without sciatica [M54.50, G89.29]      Treatment Diagnosis: decreased bilateral LE & core & trunk strength, decreased functional strength & endurance, decreased ability to perform functional mobility tasks & ADLs, decreased postural awareness/musculature strength, & increased low back pain with intermittent anterior thigh numbness    Visit Information:  Insurance: Payor: BCBS / Plan: BCBS OUT OF STATE / Product Type: *No Product type* /   PT Visit Information  Onset Date:  (2023)  PT Insurance Information: BCBS  Total # of Visits Approved: 1 (5 v -24)  Total # of Visits to Date: 3  No Show: 0  Canceled Appointment: 0  Progress Note Counter: -    Subjective Information:  Subjective: Pt states periodic stiffness in lower back, saturday she felt like she \"got hit by a bus\", reports everything was stiff and sore, however felt better on  and today.  HEP Compliance:  [x] Good [] Fair [] Poor [] Reports not doing due to:    Pain Screening  Patient Currently in Pain: Denies    Treatment:  Exercises:  Exercises  Exercise 1: bridges 2 sets x 10 reps  Exercise 2: LTR 5\" x 15  Exercise 3: SLR; S/L hip abduction x15 reps, each, robert  Exercise 5: bike, seat 6 x 5'  Exercise 9: H/L hip march 2 sets x 15 reps x RTB  Exercise 10: piriformis str 3 reps x 20 sec holds, robert  Exercise 11: hamstring str w/ strap 3 reps x 20 sec, robert  Exercise 12: supine ITB str w/ strap 3 reps x 20 sec holds, robert  Exercise 20: HEP: continue current + ITB, ham, piriformis    *Indicates exercise, modality, or manual techniques to be initiated when appropriate    Objective Measures: Not Tested    Assessment:   Body Structures, Functions, Activity Limitations Requiring Skilled

## 2024-01-11 ENCOUNTER — HOSPITAL ENCOUNTER (OUTPATIENT)
Dept: PHYSICAL THERAPY | Age: 54
Setting detail: THERAPIES SERIES
Discharge: HOME | End: 2024-01-11
Attending: INTERNAL MEDICINE
Payer: COMMERCIAL

## 2024-01-11 PROCEDURE — 97140 MANUAL THERAPY 1/> REGIONS: CPT

## 2024-01-11 PROCEDURE — 97110 THERAPEUTIC EXERCISES: CPT

## 2024-01-11 NOTE — PROGRESS NOTES
[facility]  Outpatient Physical Therapy   Treatment Note        Date: 2024  Patient: Isabella Lara  : 1970   Confirmed: Yes  MRN: [id number]  Referring Provider: Evi Gamino MD      Medical Diagnosis: Chronic midline low back pain without sciatica [M54.50, G89.29]      Treatment Diagnosis: decreased bilateral LE & core & trunk strength, decreased functional strength & endurance, decreased ability to perform functional mobility tasks & ADLs, decreased postural awareness/musculature strength, & increased low back pain with intermittent anterior thigh numbness    Visit Information:  Insurance: Payor: BCBS / Plan: BCBS OUT OF STATE / Product Type: *No Product type* /   PT Visit Information  Onset Date:  (2023)  PT Insurance Information: BCBS  Total # of Visits Approved: 1 (5 v -24)  Total # of Visits to Date: 3  No Show: 0  Canceled Appointment: 0  Progress Note Counter: 3/8-    Subjective Information:  Subjective: patient reports \"I think we got something on monday, I left here and was not sore or painful after\" Patient reports some stiffness in back due to sitting in the car for a long period of time.  HEP Compliance:  [x] Good [] Fair [] Poor [] Reports not doing due to:    Pain Screening  Patient Currently in Pain: Denies    Treatment:  Exercises:  Exercises  Exercise 1: bridge x10 reps - mild pain with movement  Exercise 3: 3-way SLR x15 reps, robert  Exercise 5: bike, seat 6 x 5'  Exercise 10: piriformis str 5 reps x 20 sec holds, robert  Exercise 11: hamstring str w/ strap 5 reps x 20 sec, robert  Exercise 13: PPT x10 reps  Exercise 20: HEP: continue current + PPT     Manual:   Manual Therapy  Soft Tissue Mobilizaton: STM with tennis ball R paraspinals x3 min  Other: Static cupping to R paraspinals x5    *Indicates exercise, modality, or manual techniques to be initiated when appropriate    Objective Measures: Not Tested    Assessment:   Body Structures, Functions, Activity

## 2024-01-15 ENCOUNTER — HOSPITAL ENCOUNTER (OUTPATIENT)
Dept: PHYSICAL THERAPY | Age: 54
Setting detail: THERAPIES SERIES
Discharge: HOME | End: 2024-01-15
Attending: INTERNAL MEDICINE
Payer: COMMERCIAL

## 2024-01-15 PROCEDURE — 97110 THERAPEUTIC EXERCISES: CPT

## 2024-01-15 NOTE — PLAN OF CARE
[facility]  PHYSICAL THERAPY PLAN OF CARE                                    [facility] [place] [address]     Ph: [phone]  Fax: [phone]    [] Certification  [x] Recertification [x]  Plan of Care  [] Progress Note [] Discharge      Referring Provider: Evi Gamino MD    From:  Brigitte Alvarado PT     Patient: Isabella Lara (53 y.o. female) : 1970 Date: 01/15/2024   Medical Diagnosis: Chronic midline low back pain without sciatica [M54.50, G89.29]    Treatment Diagnosis: decreased bilateral LE & core & trunk strength, decreased functional strength & endurance, decreased ability to perform functional mobility tasks & ADLs, decreased postural awareness/musculature strength, & increased low back pain with intermittent anterior thigh numbness    Plan of Care/Certification Expiration Date:     Progress Report Period from: 2023  to 1/15/2024    Visits to Date: 4 No Show: 0 Cancelled Appts: 0    OBJECTIVE:   Long Term Goals - Time Frame for Long Term Goals : 4-6 weeks  Goals Current/ Discharge status Status   Long Term Goal 1: Patient will be independent/compliant with PT HEP in order to demonstrate ability to self manage symptoms upon discharge. LTG 1 Current Status:: 1/15: reports compliance, completing MWF   In progress   Long Term Goal 2: Patient will improve core & trunk & bilateral LE strength >/=4+/5 in order to demonstrate improved ability to perform functional mobility tasks & ADLs. LTG 2 [facility]:: [facility]15[facility]  Strength LLE  L Hip Flexion: 5/5  L Hip Extension: 4/5  L Hip ABduction: 4/5  L Hip ADduction: 4/5  Strength RLE  R Hip Flexion: 5/5  R Hip Extension: 4+/5  R Hip ABduction: 4/5  R Hip ADduction: 4-/5  In progress   Long Term Goal 3: Patient will improve Oswestry score </=6 points in order to demonstrate improved ability to perform functional mobility tasks & ADLs as well as improved functional endurance. LTG 3 Current Status:: 1/15:

## 2024-01-15 NOTE — PROGRESS NOTES
mechanics, Anatomy of condition  Activity Tolerance  Activity Tolerance: Patient tolerated treatment well    Post-Pain Assessment:       Pain Rating (0-10 pain scale):   0/10   Location and pain description same as pre-treatment unless indicated.   Action: [] NA   [x] Perform HEP  [] Meds as prescribed  [] Modalities as prescribed   [] Call Physician     GOALS   Patient Goal(s): Patient Goals : 'feel bettter physically & mentally'    Long Term Goals Completed by 4-6 weeks Goal Status   LTG 1 Patient will be independent/compliant with PT HEP in order to demonstrate ability to self manage symptoms upon discharge. In progress   LTG 2 Patient will improve core & trunk & bilateral LE strength >/=4+/5 in order to demonstrate improved ability to perform functional mobility tasks & ADLs. In progress   LTG 3 Patient will improve Oswestry score </=6 points in order to demonstrate improved ability to perform functional mobility tasks & ADLs as well as improved functional endurance. In progress   LTG 4 Patient will self-report consistent ability to stand & ambulate >/=30 minutes independently with no device without increased low back pain and/or anterior thigh numbness in order to demonstrate improved ability to perform functional mobility tasks & ADLs In progress   LTG 5 Patient will self-report consistent ability to perform stairs with 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

## 2024-01-18 ENCOUNTER — HOSPITAL ENCOUNTER (OUTPATIENT)
Dept: PHYSICAL THERAPY | Age: 54
Setting detail: THERAPIES SERIES
Discharge: HOME | End: 2024-01-18
Attending: INTERNAL MEDICINE
Payer: COMMERCIAL

## 2024-01-18 PROCEDURE — 97110 THERAPEUTIC EXERCISES: CPT

## 2024-01-18 NOTE — PROGRESS NOTES
[facility]  Outpatient Physical Therapy   Treatment Note        Date: 2024  Patient: Isabella Lara  : 1970   Confirmed: Yes  MRN: [id number]  Referring Provider: Evi Gamino MD      Medical Diagnosis: Chronic midline low back pain without sciatica [M54.50, G89.29]      Treatment Diagnosis: decreased bilateral LE & core & trunk strength, decreased functional strength & endurance, decreased ability to perform functional mobility tasks & ADLs, decreased postural awareness/musculature strength, & increased low back pain with intermittent anterior thigh numbness    Visit Information:  Insurance: Payor: BCBS / Plan: BCBS OUT OF STATE / Product Type: *No Product type* /   PT Visit Information  Onset Date:  (2023)  PT Insurance Information: BCBS  Total # of Visits Approved: 1 (5 v -24)  Total # of Visits to Date: 5  No Show: 0  Canceled Appointment: 0  Progress Note Counter:     Subjective Information:  Subjective: patient reports feeling good after the last visit, no trouble. no pain currently, however soreness on right side throughout the day.  HEP Compliance:  [x] Good [] Fair [] Poor [] Reports not doing due to:    Pain Screening  Patient Currently in Pain: Denies    Treatment:  Exercises:  Exercises  Exercise 2: LTR with pball 5\" x 10 reps  Exercise 6: step-ups Fwd runner step-ups x10 reps  Exercise 7: standing plank rotation 10 reps x 5 sec holds  Exercise 8: lumbar extension on red pball 12 reps x 5 sec holds  Exercise 11: hamstring str w/ strap 5 reps x 20 sec, robert  Exercise 14: DKTC with pball 10 reps x 5 sec holds  Exercise 15: 3-way pball roll-outs 10 reps x 5 sec holds  Exercise 16: lateral stepping and marching // bar YTB above knee x3 laps each  Exercise 20: HEP: continue current    *Indicates exercise, modality, or manual techniques to be initiated when appropriate    Objective Measures: Not Tested    Assessment:   Body Structures, Functions, Activity

## 2024-01-25 ENCOUNTER — TELEPHONE (OUTPATIENT)
Dept: OBGYN CLINIC | Age: 54
End: 2024-01-25

## 2024-01-25 NOTE — TELEPHONE ENCOUNTER
LM for pt to call the office.  Pt is due for an annual exam (10/26/22).  Would she like to schedule?

## 2024-01-26 NOTE — TELEPHONE ENCOUNTER
Pt called back to the office, and stated she no longer is seeing Dr Mcgraw, and will not be coming back.  No reason given, just changed provider.

## 2024-02-06 ENCOUNTER — HOSPITAL ENCOUNTER (OUTPATIENT)
Dept: PHYSICAL THERAPY | Age: 54
Setting detail: THERAPIES SERIES
Discharge: HOME | End: 2024-02-06
Attending: INTERNAL MEDICINE
Payer: COMMERCIAL

## 2024-02-06 PROCEDURE — 97110 THERAPEUTIC EXERCISES: CPT

## 2024-02-06 NOTE — PROGRESS NOTES
Status:: 2/6: patient reports being more aware of postural mechanics     Assessment:   Body Structures, Functions, Activity Limitations Requiring Skilled Therapeutic Intervention: Decreased functional mobility , Decreased ADL status, Decreased endurance, Increased pain, Decreased strength, Decreased posture  Assessment: patient presented after 2 week hiatus due to insurance. Patient presented without pain this date. Significant education and discussion with patient regarding posture, body mechanics and overall neuro-muscular interactions in regards to current chief complaint. Progressed activity to focus on hip and lumbar strengthening to improve overall stability and tolerance with patient tolerating well. Progress towards goals as tolerated.  Treatment Diagnosis: decreased bilateral LE & core & trunk strength, decreased functional strength & endurance, decreased ability to perform functional mobility tasks & ADLs, decreased postural awareness/musculature strength, & increased low back pain with intermittent anterior thigh numbness  Therapy Prognosis: Good  PT Education: Plan of Care, Home Exercise Program, Body mechanics, Ergonomics, Posture, Anatomy of condition  Activity Tolerance  Activity Tolerance: Patient tolerated treatment well    Post-Pain Assessment:       Pain Rating (0-10 pain scale):  0 /10   Location and pain description same as pre-treatment unless indicated.   Action: [] NA   [x] Perform HEP  [] Meds as prescribed  [] Modalities as prescribed   [] Call Physician     GOALS   Patient Goal(s): Patient Goals : 'feel bettter physically & mentally'    Long Term Goals Completed by 4-6 weeks Goal Status   LTG 1 Patient will be independent/compliant with PT HEP in order to demonstrate ability to self manage symptoms upon discharge. In progress   LTG 2 Patient will improve core & trunk & bilateral LE strength >/=4+/5 in order to demonstrate improved ability to perform functional mobility tasks & ADLs. In

## 2024-02-07 ENCOUNTER — APPOINTMENT (OUTPATIENT)
Dept: PHYSICAL THERAPY | Age: 54
End: 2024-02-07
Attending: INTERNAL MEDICINE
Payer: COMMERCIAL

## 2024-02-12 ENCOUNTER — HOSPITAL ENCOUNTER (OUTPATIENT)
Dept: PHYSICAL THERAPY | Age: 54
Setting detail: THERAPIES SERIES
Discharge: HOME | End: 2024-02-12
Attending: INTERNAL MEDICINE
Payer: COMMERCIAL

## 2024-02-12 DIAGNOSIS — I15.9 SECONDARY HYPERTENSION: ICD-10-CM

## 2024-02-12 NOTE — PROGRESS NOTES
Therapy                            Cancellation/No-show Note    Date: 2024  Patient: Isabella Lara (53 y.o. female)  : 1970  MRN:  [id number]  Referring Physician: Evi Gamino MD    Medical Diagnosis: Chronic midline low back pain without sciatica [M54.50, G89.29]      Visit Information:  Insurance: Payor: BCBS / Plan: BCBS OUT OF STATE / Product Type: *No Product type* /   Visits to Date: 6   No Show/Cancelled Appts: 0 / 1      For today's appointment patient:  [x]  Cancelled  []  Rescheduled appointment  []  No-show   []  Called pt to remind of next appointment     Reason given by patient:  []  Patient ill  []  Conflicting appointment  []  No transportation    [x]  Conflict with work  []  No reason given  []  Other:      [x] Pt has future appointments scheduled, no follow up needed  [] Pt requests to be on hold.    Reason:   If > 2 weeks please discuss with therapist.  [] Therapist to call pt for follow up     Comments:       Signature: Electronically signed by Brigitte Alvarado PT on 24 at 11:16 AM EST

## 2024-02-14 RX ORDER — AMLODIPINE BESYLATE 5 MG/1
5 TABLET ORAL DAILY
Qty: 90 TABLET | Refills: 3 | Status: SHIPPED | OUTPATIENT
Start: 2024-02-14

## 2024-02-14 RX ORDER — METOPROLOL TARTRATE 50 MG/1
50 TABLET, FILM COATED ORAL 2 TIMES DAILY
Qty: 120 TABLET | Refills: 5 | Status: SHIPPED | OUTPATIENT
Start: 2024-02-14

## 2024-02-15 ENCOUNTER — APPOINTMENT (OUTPATIENT)
Dept: PHYSICAL THERAPY | Age: 54
End: 2024-02-15
Attending: INTERNAL MEDICINE
Payer: COMMERCIAL

## 2024-04-01 ENCOUNTER — OFFICE VISIT (OUTPATIENT)
Dept: OBGYN CLINIC | Age: 54
End: 2024-04-01
Payer: COMMERCIAL

## 2024-04-01 VITALS
DIASTOLIC BLOOD PRESSURE: 80 MMHG | HEART RATE: 76 BPM | WEIGHT: 290 LBS | BODY MASS INDEX: 41.52 KG/M2 | HEIGHT: 70 IN | SYSTOLIC BLOOD PRESSURE: 120 MMHG

## 2024-04-01 DIAGNOSIS — Z12.31 VISIT FOR SCREENING MAMMOGRAM: ICD-10-CM

## 2024-04-01 DIAGNOSIS — Z01.419 WELL WOMAN EXAM WITH ROUTINE GYNECOLOGICAL EXAM: ICD-10-CM

## 2024-04-01 DIAGNOSIS — Z01.419 WELL WOMAN EXAM WITH ROUTINE GYNECOLOGICAL EXAM: Primary | ICD-10-CM

## 2024-04-01 PROCEDURE — 99396 PREV VISIT EST AGE 40-64: CPT | Performed by: OBSTETRICS & GYNECOLOGY

## 2024-04-01 RX ORDER — ESTRADIOL 0.1 MG/G
CREAM VAGINAL
Qty: 1 EACH | Refills: 3 | Status: SHIPPED | OUTPATIENT
Start: 2024-04-01

## 2024-04-01 SDOH — ECONOMIC STABILITY: INCOME INSECURITY: HOW HARD IS IT FOR YOU TO PAY FOR THE VERY BASICS LIKE FOOD, HOUSING, MEDICAL CARE, AND HEATING?: NOT HARD AT ALL

## 2024-04-01 SDOH — ECONOMIC STABILITY: FOOD INSECURITY: WITHIN THE PAST 12 MONTHS, YOU WORRIED THAT YOUR FOOD WOULD RUN OUT BEFORE YOU GOT MONEY TO BUY MORE.: NEVER TRUE

## 2024-04-01 SDOH — ECONOMIC STABILITY: FOOD INSECURITY: WITHIN THE PAST 12 MONTHS, THE FOOD YOU BOUGHT JUST DIDN'T LAST AND YOU DIDN'T HAVE MONEY TO GET MORE.: NEVER TRUE

## 2024-04-01 ASSESSMENT — PATIENT HEALTH QUESTIONNAIRE - PHQ9
2. FEELING DOWN, DEPRESSED OR HOPELESS: NOT AT ALL
1. LITTLE INTEREST OR PLEASURE IN DOING THINGS: NOT AT ALL
SUM OF ALL RESPONSES TO PHQ QUESTIONS 1-9: 0
SUM OF ALL RESPONSES TO PHQ9 QUESTIONS 1 & 2: 0
SUM OF ALL RESPONSES TO PHQ QUESTIONS 1-9: 0

## 2024-04-01 NOTE — PROGRESS NOTES
Postmenopausal Annual    Subjective:     Isabella Lara is a 53 y.o. year old female    female who presents today for her annual well woman exam.  The patient is sexuallyactive.  The patient is not taking hormone replacement therapy. Patient denies post-menopausal vaginal bleeding.    The patient has regular exercise: no. C/o of vaginal dryness and pain with intercourse sometimes despite using lubricants .     Vitals:  /80 (Site: Left Upper Arm)   Pulse 76   Ht 1.778 m (5' 10\")   Wt 131.5 kg (290 lb)   LMP 2023   BMI 41.61 kg/m²   Allergies:  Penicillin g, Penicillins, Codeine, Meperidine, and Demerol  Past Medical History:   Diagnosis Date    Hypothyroidism     Secondary hypertension 2021    Sleep apnea 2016    dr Hu, mild, c pap    Unspecified vitamin D deficiency      Past Surgical History:   Procedure Laterality Date    APPENDECTOMY      CARPAL TUNNEL RELEASE Right     CHOLECYSTECTOMY      DILATION AND CURETTAGE OF UTERUS      HYSTERECTOMY (CERVIX STATUS UNKNOWN)      HYSTERECTOMY, VAGINAL N/A 2023    TOTAL LAPAROSCOPIC  HYSTERECTOMY, BILATERAL SALPINGECTOMY performed [place]ccarty [place] [facility] [place]    OVARY REMOVAL      SHOULDER SURGERY Right     THUMB ARTHROSCOPY Left     WISDOM TOOTH EXTRACTION       Family History   Problem Relation Age of Onset    Cirrhosis Mother     Diabetes Mother     High Blood Pressure Father     Heart Disease Father     Breast Cancer Maternal Aunt      Social History     Socioeconomic History    Marital status:      Spouse name: Not on file    Number of children: Not on file    Years of education: Not on file    Highest education level: Not on file   Occupational History    Not on file   Tobacco Use    Smoking status: Never    Smokeless tobacco: Never   Vaping Use    Vaping Use: Never used   Substance and Sexual Activity    Alcohol use: No     Comment: occasional/rare    Drug use: Never    Sexual activity: Yes     
urinary symptoms

## 2024-04-06 LAB
HPV HR 12 DNA SPEC QL NAA+PROBE: NOT DETECTED
HPV16 DNA SPEC QL NAA+PROBE: NOT DETECTED
HPV16+18+H RISK 12 DNA SPEC-IMP: NORMAL
HPV18 DNA SPEC QL NAA+PROBE: NOT DETECTED

## 2024-04-12 ENCOUNTER — HOSPITAL ENCOUNTER (OUTPATIENT)
Dept: WOMENS IMAGING | Age: 54
Discharge: HOME | End: 2024-04-12
Payer: COMMERCIAL

## 2024-04-12 VITALS — BODY MASS INDEX: 40.47 KG/M2 | HEIGHT: 71 IN

## 2024-04-12 DIAGNOSIS — Z12.31 VISIT FOR SCREENING MAMMOGRAM: ICD-10-CM

## 2024-04-12 PROCEDURE — 77063 BREAST TOMOSYNTHESIS BI: CPT

## 2024-05-06 ENCOUNTER — OFFICE VISIT (OUTPATIENT)
Dept: OBGYN CLINIC | Age: 54
End: 2024-05-06
Payer: COMMERCIAL

## 2024-05-06 VITALS
HEIGHT: 70 IN | WEIGHT: 285 LBS | BODY MASS INDEX: 40.8 KG/M2 | SYSTOLIC BLOOD PRESSURE: 138 MMHG | HEART RATE: 76 BPM | DIASTOLIC BLOOD PRESSURE: 78 MMHG

## 2024-05-06 DIAGNOSIS — N95.2 ATROPHIC VAGINITIS: ICD-10-CM

## 2024-05-06 DIAGNOSIS — R23.2 HOT FLASHES: Primary | ICD-10-CM

## 2024-05-06 PROCEDURE — 99213 OFFICE O/P EST LOW 20 MIN: CPT | Performed by: OBSTETRICS & GYNECOLOGY

## 2024-05-06 RX ORDER — ESTRADIOL 0.1 MG/G
CREAM VAGINAL
Qty: 1 EACH | Refills: 6 | Status: SHIPPED | OUTPATIENT
Start: 2024-05-06

## 2024-05-06 RX ORDER — ESTRADIOL 0.04 MG/D
1 FILM, EXTENDED RELEASE TRANSDERMAL
Qty: 8 PATCH | Refills: 3 | Status: SHIPPED | OUTPATIENT
Start: 2024-05-06

## 2024-05-06 NOTE — PROGRESS NOTES
Isabella Lara is a 53 y.o. female who presents here today for complaints of Follow-up (1 month estradiol cream. )    Doing well, no side effects. Patient also complains of hot flashes and mood changes associated due to menopause , affecting relationships .   .      Vitals:  /78 (Site: Right Upper Arm, Position: Sitting, Cuff Size: Large Adult)   Pulse 76   Ht 1.778 m (5' 10\")   Wt 129.3 kg (285 lb)   LMP 2023   BMI 40.89 kg/m²   Allergies:  Penicillin g, Penicillins, Codeine, Meperidine, and Demerol  Past Medical History:   Diagnosis Date    Abnormal glandular Papanicolaou smear of cervix 2023    Hypothyroidism     Secondary hypertension 2021    Sleep apnea 2016    dr Hu, mild, c pap    Unspecified vitamin D deficiency      Past Surgical History:   Procedure Laterality Date    APPENDECTOMY      CARPAL TUNNEL RELEASE Right     CHOLECYSTECTOMY      DILATION AND CURETTAGE OF UTERUS      HYSTERECTOMY (CERVIX STATUS UNKNOWN)  2023    partial    HYSTERECTOMY, VAGINAL N/A 2023    TOTAL LAPAROSCOPIC  HYSTERECTOMY, BILATERAL SALPINGECTOMY performed by Jennifer Mccarty MD at [facility] [place]    SHOULDER SURGERY Right     THUMB ARTHROSCOPY Left     WISDOM TOOTH EXTRACTION       OB History          1    Para        Term   0            AB   1    Living   0         SAB   1    IAB        Ectopic        Molar        Multiple        Live Births   0              Family History   Problem Relation Age of Onset    Cirrhosis Mother     Diabetes Mother     High Blood Pressure Father     Heart Disease Father     Breast Cancer Maternal Aunt 59        does not know laterality     Social History     Socioeconomic History    Marital status:      Spouse name: Not on file    Number of children: Not on file    Years of education: Not on file    Highest education level: Not on file   Occupational History    Not on file   Tobacco Use    Smoking status: Never    Smokeless tobacco:

## 2024-06-11 ENCOUNTER — OFFICE VISIT (OUTPATIENT)
Dept: OBGYN CLINIC | Age: 54
End: 2024-06-11

## 2024-06-11 VITALS
DIASTOLIC BLOOD PRESSURE: 84 MMHG | HEIGHT: 70 IN | BODY MASS INDEX: 41.23 KG/M2 | WEIGHT: 288 LBS | SYSTOLIC BLOOD PRESSURE: 134 MMHG | HEART RATE: 80 BPM

## 2024-06-11 DIAGNOSIS — R63.8 UNABLE TO LOSE WEIGHT: ICD-10-CM

## 2024-06-11 RX ORDER — TOPIRAMATE 50 MG/1
25 TABLET, FILM COATED ORAL 2 TIMES DAILY
Qty: 60 TABLET | Refills: 0 | Status: SHIPPED | OUTPATIENT
Start: 2024-06-11

## 2024-06-11 RX ORDER — PHENTERMINE HYDROCHLORIDE 37.5 MG/1
37.5 TABLET ORAL
Qty: 30 TABLET | Refills: 0 | Status: SHIPPED | OUTPATIENT
Start: 2024-06-11 | End: 2024-07-11

## 2024-06-11 NOTE — PROGRESS NOTES
Medication FollowUp     Isabella Lara is a 53 y.o. year old female here todiscuss symptoms after use of medications prescribed last visit. Symptoms  vivelle HRT , topical estrace cream  .Medication/s prescribed estrogen patch, and topical estrogen cream  . Side effects reported : none. Mentions symptoms improved : yes - .     Vitals:  /84 (Site: Right Upper Arm, Position: Sitting, Cuff Size: Large Adult)   Pulse 80   Ht 1.778 m (5' 10\")   Wt 130.6 kg (288 lb)   LMP 2023   BMI 41.32 kg/m²   Allergies:  Penicillin g, Penicillins, Codeine, Meperidine, and Demerol  Past Medical History:   Diagnosis Date    Abnormal glandular Papanicolaou smear of cervix 2023    Hypothyroidism     Secondary hypertension 2021    Sleep apnea 2016    dr Hu, mild, c pap    Unspecified vitamin D deficiency         Past Surgical History:   Procedure Laterality Date    APPENDECTOMY      CARPAL TUNNEL RELEASE Right     CHOLECYSTECTOMY      DILATION AND CURETTAGE OF UTERUS      HYSTERECTOMY (CERVIX STATUS UNKNOWN)  2023    partial    HYSTERECTOMY, VAGINAL N/A 2023    TOTAL LAPAROSCOPIC  HYSTERECTOMY, BILATERAL SALPINGECTOMY performed by Jennifer Mccarty MD at [facility] [place]    SHOULDER SURGERY Right     THUMB ARTHROSCOPY Left     WISDOM TOOTH EXTRACTION       OB History          1    Para        Term   0            AB   1    Living   0         SAB   1    IAB        Ectopic        Molar        Multiple        Live Births   0              Family History   Problem Relation Age of Onset    Cirrhosis Mother     Diabetes Mother     High Blood Pressure Father     Heart Disease Father     Breast Cancer Maternal Aunt 59        does not know laterality     Social History     Socioeconomic History    Marital status:      Spouse name: Not on file    Number of children: Not on file    Years of education: Not on file    Highest education level: Not on file   Occupational History    Not on file

## 2024-06-24 DIAGNOSIS — R63.8 UNABLE TO LOSE WEIGHT: ICD-10-CM

## 2024-06-24 RX ORDER — PHENTERMINE HYDROCHLORIDE 37.5 MG/1
37.5 TABLET ORAL
Qty: 30 TABLET | Refills: 0 | Status: SHIPPED | OUTPATIENT
Start: 2024-06-24 | End: 2024-07-24

## 2024-06-24 NOTE — TELEPHONE ENCOUNTER
Pt called needs [facility] sent [facility]    Please sign and send if appropriate.

## 2024-07-08 ENCOUNTER — OFFICE VISIT (OUTPATIENT)
Dept: OBGYN CLINIC | Age: 54
End: 2024-07-08

## 2024-07-08 VITALS
HEIGHT: 70 IN | BODY MASS INDEX: 40.37 KG/M2 | DIASTOLIC BLOOD PRESSURE: 76 MMHG | HEART RATE: 88 BPM | WEIGHT: 282 LBS | SYSTOLIC BLOOD PRESSURE: 138 MMHG

## 2024-07-08 RX ORDER — TOPIRAMATE 25 MG/1
25 TABLET ORAL 2 TIMES DAILY
Qty: 60 TABLET | Refills: 0 | Status: SHIPPED | OUTPATIENT
Start: 2024-07-08

## 2024-07-08 RX ORDER — PHENTERMINE HYDROCHLORIDE 37.5 MG/1
37.5 TABLET ORAL
Qty: 30 TABLET | Refills: 0 | Status: SHIPPED | OUTPATIENT
Start: 2024-07-08 | End: 2024-08-07

## 2024-07-08 NOTE — PROGRESS NOTES
Isabella Lara is a 53 y.o. female who presents here today for complaints of Follow-up (Adipex. Has lost 6lbs since last visit.)      Wt Readings from Last 3 Encounters:   24 127.9 kg (282 lb)   24 130.6 kg (288 lb)   24 129.3 kg (285 lb)      .      Vitals:  /76 (Site: Right Upper Arm, Position: Sitting, Cuff Size: Large Adult)   Pulse 88   Ht 1.778 m (5' 10\")   Wt 127.9 kg (282 lb)   LMP 2023   BMI 40.46 kg/m²   Allergies:  Penicillin g, Penicillins, Codeine, Meperidine, and Demerol  Past Medical History:   Diagnosis Date    Abnormal glandular Papanicolaou smear of cervix 2023    Hypothyroidism     Secondary hypertension 2021    Sleep apnea 2016    dr Hu, mild, c pap    Unspecified vitamin D deficiency      Past Surgical History:   Procedure Laterality Date    APPENDECTOMY      CARPAL TUNNEL RELEASE Right     CHOLECYSTECTOMY      DILATION AND CURETTAGE OF UTERUS      HYSTERECTOMY (CERVIX STATUS UNKNOWN)  2023    partial    HYSTERECTOMY, VAGINAL N/A 2023    TOTAL LAPAROSCOPIC  HYSTERECTOMY, BILATERAL SALPINGECTOMY performed by Jennifer Mccarty MD at [facility] [place]    SHOULDER SURGERY Right     THUMB ARTHROSCOPY Left     WISDOM TOOTH EXTRACTION       OB History          1    Para        Term   0            AB   1    Living   0         SAB   1    IAB        Ectopic        Molar        Multiple        Live Births   0              Family History   Problem Relation Age of Onset    Cirrhosis Mother     Diabetes Mother     High Blood Pressure Father     Heart Disease Father     Breast Cancer Maternal Aunt 59        does not know laterality     Social History     Socioeconomic History    Marital status:      Spouse name: Not on file    Number of children: Not on file    Years of education: Not on file    Highest education level: Not on file   Occupational History    Not on file   Tobacco Use    Smoking status: Never    Smokeless tobacco:

## 2024-07-09 ENCOUNTER — TELEPHONE (OUTPATIENT)
Dept: OBGYN CLINIC | Age: 54
End: 2024-07-09

## 2024-07-09 NOTE — TELEPHONE ENCOUNTER
[facility] due to refill being sent to soon. Informed pt and let her know to call us when she needs her next refill.

## 2024-07-22 DIAGNOSIS — R63.8 UNABLE TO LOSE WEIGHT: ICD-10-CM

## 2024-07-22 RX ORDER — PHENTERMINE HYDROCHLORIDE 37.5 MG/1
37.5 TABLET ORAL
Qty: 30 TABLET | Refills: 0 | Status: SHIPPED | OUTPATIENT
Start: 2024-07-22 | End: 2024-07-25 | Stop reason: ALTCHOICE

## 2024-07-22 NOTE — TELEPHONE ENCOUNTER
Pt was in this 7/8/24, to early for rx to be sent. She needs Adipex sent to pharmacy.  Please sign and send to pharmacy if appropriate.

## 2024-07-25 RX ORDER — PHENTERMINE HYDROCHLORIDE 37.5 MG/1
37.5 TABLET ORAL
Qty: 30 TABLET | Refills: 0 | Status: SHIPPED | OUTPATIENT
Start: 2024-07-25 | End: 2024-08-24

## 2024-07-25 NOTE — TELEPHONE ENCOUNTER
Pt called, RX Adipex sent to wrong pharmacy on 7/22/24. Need to be sent to [facility] [place].     Please sign and send if appropriate.

## 2024-08-14 ENCOUNTER — OFFICE VISIT (OUTPATIENT)
Dept: OBGYN CLINIC | Age: 54
End: 2024-08-14
Payer: COMMERCIAL

## 2024-08-14 VITALS
HEART RATE: 81 BPM | WEIGHT: 277 LBS | SYSTOLIC BLOOD PRESSURE: 154 MMHG | BODY MASS INDEX: 39.65 KG/M2 | HEIGHT: 70 IN | DIASTOLIC BLOOD PRESSURE: 96 MMHG

## 2024-08-14 DIAGNOSIS — R63.8 UNABLE TO LOSE WEIGHT: ICD-10-CM

## 2024-08-14 PROCEDURE — 99213 OFFICE O/P EST LOW 20 MIN: CPT | Performed by: OBSTETRICS & GYNECOLOGY

## 2024-08-14 RX ORDER — ESTRADIOL 0.1 MG/G
CREAM VAGINAL
Qty: 3 EACH | Refills: 3 | Status: SHIPPED | OUTPATIENT
Start: 2024-08-14

## 2024-08-14 RX ORDER — TOPIRAMATE 50 MG/1
50 TABLET, FILM COATED ORAL 2 TIMES DAILY
Qty: 180 TABLET | Refills: 0 | Status: SHIPPED | OUTPATIENT
Start: 2024-08-14

## 2024-08-14 RX ORDER — TOPIRAMATE 25 MG/1
25 TABLET, FILM COATED ORAL 2 TIMES DAILY
Qty: 180 TABLET | Refills: 0 | Status: SHIPPED | OUTPATIENT
Start: 2024-08-14 | End: 2024-08-14

## 2024-08-14 RX ORDER — DOCUSATE SODIUM 100 MG/1
100 CAPSULE, LIQUID FILLED ORAL 2 TIMES DAILY PRN
Qty: 60 CAPSULE | Refills: 2 | Status: SHIPPED | OUTPATIENT
Start: 2024-08-14

## 2024-08-14 RX ORDER — ESTRADIOL 0.04 MG/D
1 PATCH, EXTENDED RELEASE TRANSDERMAL
Qty: 24 PATCH | Refills: 3 | Status: SHIPPED | OUTPATIENT
Start: 2024-08-15

## 2024-08-14 RX ORDER — PHENTERMINE HYDROCHLORIDE 37.5 MG/1
37.5 TABLET ORAL
Qty: 30 TABLET | Refills: 0 | Status: SHIPPED | OUTPATIENT
Start: 2024-08-14 | End: 2024-09-13

## 2024-08-14 NOTE — PROGRESS NOTES
Isabella Lara is a 54 y.o. female who presents here today for complaints of Follow-up      Wt Readings from Last 3 Encounters:   24 125.6 kg (277 lb)   24 127.9 kg (282 lb)   24 130.6 kg (288 lb)      .      Vitals:  BP (!) 154/96 (Site: Right Upper Arm, Position: Sitting, Cuff Size: Large Adult)   Pulse 81   Ht 1.778 m (5' 10\")   Wt 125.6 kg (277 lb)   LMP 2023   BMI 39.75 kg/m²   Allergies:  Penicillin g, Penicillins, Codeine, Meperidine, and Demerol  Past Medical History:   Diagnosis Date    Abnormal glandular Papanicolaou smear of cervix 2023    Hypothyroidism     Secondary hypertension 2021    Sleep apnea 2016    dr Hu, mild, c pap    Unspecified vitamin D deficiency      Past Surgical History:   Procedure Laterality Date    APPENDECTOMY      CARPAL TUNNEL RELEASE Right     CHOLECYSTECTOMY      DILATION AND CURETTAGE OF UTERUS      HYSTERECTOMY (CERVIX STATUS UNKNOWN)  2023    partial    HYSTERECTOMY, VAGINAL N/A 2023    TOTAL LAPAROSCOPIC  HYSTERECTOMY, BILATERAL SALPINGECTOMY performed by Jennifer Mccarty MD at [facility] [place]    SHOULDER SURGERY Right     THUMB ARTHROSCOPY Left     WISDOM TOOTH EXTRACTION       OB History          1    Para        Term   0            AB   1    Living   0         SAB   1    IAB        Ectopic        Molar        Multiple        Live Births   0              Family History   Problem Relation Age of Onset    Cirrhosis Mother     Diabetes Mother     High Blood Pressure Father     Heart Disease Father     Breast Cancer Maternal Aunt 59        does not know laterality     Social History     Socioeconomic History    Marital status:      Spouse name: Not on file    Number of children: Not on file    Years of education: Not on file    Highest education level: Not on file   Occupational History    Not on file   Tobacco Use    Smoking status: Never    Smokeless tobacco: Never   Vaping Use    Vaping status:

## 2024-08-27 DIAGNOSIS — I15.9 SECONDARY HYPERTENSION: ICD-10-CM

## 2024-08-27 RX ORDER — METOPROLOL TARTRATE 50 MG
50 TABLET ORAL 2 TIMES DAILY
Qty: 120 TABLET | Refills: 5 | Status: SHIPPED | OUTPATIENT
Start: 2024-08-27

## 2024-08-27 RX ORDER — AMLODIPINE BESYLATE 5 MG/1
5 TABLET ORAL DAILY
Qty: 90 TABLET | Refills: 3 | Status: SHIPPED | OUTPATIENT
Start: 2024-08-27

## 2024-09-18 ENCOUNTER — OFFICE VISIT (OUTPATIENT)
Dept: OBGYN CLINIC | Age: 54
End: 2024-09-18
Payer: COMMERCIAL

## 2024-09-18 VITALS
HEIGHT: 70 IN | BODY MASS INDEX: 38.37 KG/M2 | WEIGHT: 268 LBS | SYSTOLIC BLOOD PRESSURE: 114 MMHG | DIASTOLIC BLOOD PRESSURE: 72 MMHG | HEART RATE: 84 BPM

## 2024-09-18 DIAGNOSIS — K64.8 OTHER HEMORRHOIDS: ICD-10-CM

## 2024-09-18 DIAGNOSIS — K59.09 CHRONIC CONSTIPATION: ICD-10-CM

## 2024-09-18 DIAGNOSIS — R63.8 UNABLE TO LOSE WEIGHT: ICD-10-CM

## 2024-09-18 DIAGNOSIS — K60.2 ANAL FISSURE: ICD-10-CM

## 2024-09-18 PROCEDURE — 99214 OFFICE O/P EST MOD 30 MIN: CPT | Performed by: OBSTETRICS & GYNECOLOGY

## 2024-09-18 RX ORDER — HYDROCORTISONE ACETATE 25 MG/1
25 SUPPOSITORY RECTAL EVERY 12 HOURS
Qty: 30 SUPPOSITORY | Refills: 1 | Status: SHIPPED | OUTPATIENT
Start: 2024-09-18

## 2024-09-18 RX ORDER — IBUPROFEN 800 MG/1
TABLET, FILM COATED ORAL
Qty: 40 TABLET | Refills: 0 | Status: SHIPPED | OUTPATIENT
Start: 2024-09-18

## 2024-09-18 RX ORDER — CYCLOBENZAPRINE HCL 10 MG
10 TABLET ORAL NIGHTLY PRN
Qty: 30 TABLET | Refills: 0 | Status: SHIPPED | OUTPATIENT
Start: 2024-09-18 | End: 2024-10-18

## 2024-09-18 RX ORDER — PHENTERMINE HYDROCHLORIDE 37.5 MG/1
37.5 TABLET ORAL
Qty: 30 TABLET | Refills: 0 | Status: SHIPPED | OUTPATIENT
Start: 2024-09-18 | End: 2024-10-18

## 2024-09-18 RX ORDER — PHENTERMINE HYDROCHLORIDE 37.5 MG/1
TABLET ORAL
COMMUNITY
Start: 2024-08-24

## 2024-09-18 RX ORDER — TOPIRAMATE 50 MG/1
50 TABLET, FILM COATED ORAL 2 TIMES DAILY
Qty: 60 TABLET | Refills: 0 | Status: SHIPPED | OUTPATIENT
Start: 2024-09-18

## 2024-09-18 RX ORDER — LIDOCAINE 50 MG/G
OINTMENT TOPICAL
Qty: 1 EACH | Refills: 1 | Status: SHIPPED | OUTPATIENT
Start: 2024-09-18

## 2024-10-16 ENCOUNTER — OFFICE VISIT (OUTPATIENT)
Dept: OBGYN CLINIC | Age: 54
End: 2024-10-16
Payer: COMMERCIAL

## 2024-10-16 VITALS
WEIGHT: 265 LBS | HEART RATE: 71 BPM | DIASTOLIC BLOOD PRESSURE: 70 MMHG | SYSTOLIC BLOOD PRESSURE: 118 MMHG | BODY MASS INDEX: 38.02 KG/M2

## 2024-10-16 DIAGNOSIS — K64.8 OTHER HEMORRHOIDS: ICD-10-CM

## 2024-10-16 DIAGNOSIS — K60.2 ANAL FISSURE: ICD-10-CM

## 2024-10-16 PROCEDURE — 99213 OFFICE O/P EST LOW 20 MIN: CPT | Performed by: OBSTETRICS & GYNECOLOGY

## 2024-10-16 RX ORDER — TOPIRAMATE 50 MG/1
25 TABLET, FILM COATED ORAL 2 TIMES DAILY
Qty: 60 TABLET | Refills: 0 | Status: SHIPPED | OUTPATIENT
Start: 2024-10-16 | End: 2024-10-25

## 2024-10-16 RX ORDER — PHENTERMINE HYDROCHLORIDE 37.5 MG/1
37.5 TABLET ORAL
Qty: 30 TABLET | Refills: 0 | Status: SHIPPED | OUTPATIENT
Start: 2024-10-16 | End: 2024-11-15

## 2024-10-16 NOTE — PROGRESS NOTES
behavioral therapy is to help patients make long-term changes in their eating behavior by modifying and monitoring their food intake, modifying their physical activity, and controlling cues and stimuli in the environment that trigger eating. These concepts are usually included in programs conducted by psychologists or other trained personnel as well as many self-help groups    Patient was seen with total face to face time of 30 minutes. More than 50% of this visit was counseling and education regarding The primary encounter diagnosis was BMI 38.0-38.9,adult. Diagnoses of Other hemorrhoids and Anal fissure were also pertinent to this visit. and Follow-up (Adipex for weight management )   as well as  counseling on preventative health maintenance follow-up.    No orders of the defined types were placed in this encounter.    Orders Placed This Encounter   Medications    phentermine (ADIPEX-P) 37.5 MG tablet     Sig: Take 1 tablet by mouth every morning (before breakfast) for 30 days. Max Daily Amount: 37.5 mg     Dispense:  30 tablet     Refill:  0    topiramate (TOPAMAX) 50 MG tablet     Sig: Take 0.5 tablets by mouth 2 times daily     Dispense:  60 tablet     Refill:  0       Follow Up:  Return in about 4 weeks (around 11/13/2024) for weight check and medication refill.        Jennifer Mccarty MD

## 2024-10-25 ENCOUNTER — OFFICE VISIT (OUTPATIENT)
Dept: PRIMARY CARE CLINIC | Age: 54
End: 2024-10-25

## 2024-10-25 VITALS
OXYGEN SATURATION: 98 % | HEIGHT: 70 IN | TEMPERATURE: 98 F | SYSTOLIC BLOOD PRESSURE: 122 MMHG | DIASTOLIC BLOOD PRESSURE: 82 MMHG | HEART RATE: 81 BPM | BODY MASS INDEX: 38.05 KG/M2 | WEIGHT: 265.8 LBS

## 2024-10-25 DIAGNOSIS — K60.2 ANAL FISSURE: ICD-10-CM

## 2024-10-25 DIAGNOSIS — Z00.00 HEALTH CARE MAINTENANCE: ICD-10-CM

## 2024-10-25 DIAGNOSIS — Z00.00 ANNUAL PHYSICAL EXAM: Primary | ICD-10-CM

## 2024-10-25 DIAGNOSIS — N28.9 KIDNEY DISEASE: ICD-10-CM

## 2024-10-25 DIAGNOSIS — I15.9 SECONDARY HYPERTENSION: ICD-10-CM

## 2024-10-25 DIAGNOSIS — E87.6 HYPOKALEMIA: ICD-10-CM

## 2024-10-25 RX ORDER — MINERAL OIL
OIL (ML) MISCELLANEOUS
COMMUNITY
Start: 2024-10-10

## 2024-10-25 ASSESSMENT — ENCOUNTER SYMPTOMS
DIARRHEA: 0
ABDOMINAL PAIN: 0
SHORTNESS OF BREATH: 0
COUGH: 0
WHEEZING: 0
CONSTIPATION: 1
VOMITING: 0
NAUSEA: 0

## 2024-11-12 ENCOUNTER — OFFICE VISIT (OUTPATIENT)
Dept: OBGYN CLINIC | Age: 54
End: 2024-11-12
Payer: COMMERCIAL

## 2024-11-12 VITALS
SYSTOLIC BLOOD PRESSURE: 132 MMHG | BODY MASS INDEX: 37.65 KG/M2 | HEART RATE: 88 BPM | WEIGHT: 263 LBS | HEIGHT: 70 IN | DIASTOLIC BLOOD PRESSURE: 86 MMHG

## 2024-11-12 DIAGNOSIS — R63.8 UNABLE TO LOSE WEIGHT: ICD-10-CM

## 2024-11-12 PROCEDURE — 99213 OFFICE O/P EST LOW 20 MIN: CPT | Performed by: OBSTETRICS & GYNECOLOGY

## 2024-11-12 RX ORDER — PHENTERMINE HYDROCHLORIDE 37.5 MG/1
37.5 TABLET ORAL
Qty: 30 TABLET | Refills: 0 | Status: SHIPPED | OUTPATIENT
Start: 2024-11-12 | End: 2024-12-12

## 2024-11-12 RX ORDER — TOPIRAMATE 50 MG/1
50 TABLET, FILM COATED ORAL 2 TIMES DAILY
Qty: 60 TABLET | Refills: 0 | Status: SHIPPED | OUTPATIENT
Start: 2024-11-12

## 2024-11-12 NOTE — PROGRESS NOTES
Isabella Lara is a 54 y.o. female who presents here today for complaints of Follow-up     Wt Readings from Last 3 Encounters:   24 119.3 kg (263 lb)   10/25/24 120.6 kg (265 lb 12.8 oz)   10/16/24 120.2 kg (265 lb)      2 lbs over last 4 weeks .   Total ~ 22 lbs since beginning of adipex trial in 2024     Vitals:  /86 (Site: Left Upper Arm, Position: Sitting, Cuff Size: Large Adult)   Pulse 88   Ht 1.778 m (5' 10\")   Wt 119.3 kg (263 lb)   LMP 2023   BMI 37.74 kg/m²   Allergies:  Penicillin g, Penicillins, Codeine, Meperidine, and Demerol  Past Medical History:   Diagnosis Date    Abnormal glandular Papanicolaou smear of cervix 2023    Hypothyroidism     Secondary hypertension 2021    Sleep apnea 2016    dr Hu, mild, c pap    Unspecified vitamin D deficiency      Past Surgical History:   Procedure Laterality Date    APPENDECTOMY      CARPAL TUNNEL RELEASE Right     CHOLECYSTECTOMY      DILATION AND CURETTAGE OF UTERUS      HYSTERECTOMY (CERVIX STATUS UNKNOWN)  2023    partial    HYSTERECTOMY, VAGINAL N/A 2023    TOTAL LAPAROSCOPIC  HYSTERECTOMY, BILATERAL SALPINGECTOMY performed by Jennifer Mccarty MD at [facility] [place]    SHOULDER SURGERY Right     THUMB ARTHROSCOPY Left     WISDOM TOOTH EXTRACTION       OB History          1    Para        Term   0            AB   1    Living   0         SAB   1    IAB        Ectopic        Molar        Multiple        Live Births   0              Family History   Problem Relation Age of Onset    Cirrhosis Mother     Diabetes Mother     High Blood Pressure Father     Heart Disease Father     Breast Cancer Maternal Aunt 59        does not know laterality     Social History     Socioeconomic History    Marital status:      Spouse name: Not on file    Number of children: Not on file    Years of education: Not on file    Highest education level: Not on file   Occupational History    Not on file   Tobacco Use

## 2025-01-03 ENCOUNTER — PATIENT MESSAGE (OUTPATIENT)
Dept: PRIMARY CARE CLINIC | Age: 55
End: 2025-01-03

## 2025-01-06 ENCOUNTER — TELEPHONE (OUTPATIENT)
Dept: PRIMARY CARE CLINIC | Age: 55
End: 2025-01-06

## 2025-01-06 RX ORDER — TOPIRAMATE 50 MG/1
50 TABLET, FILM COATED ORAL 2 TIMES DAILY
Qty: 180 TABLET | Refills: 1 | Status: SHIPPED | OUTPATIENT
Start: 2025-01-06

## 2025-01-08 DIAGNOSIS — R63.8 UNABLE TO LOSE WEIGHT: Primary | ICD-10-CM

## 2025-01-08 DIAGNOSIS — E66.811 OBESITY, CLASS I, BMI 30-34.9: ICD-10-CM

## 2025-01-08 NOTE — TELEPHONE ENCOUNTER
Patient sent [email or web address] message with her most recent weight from a doctor visit 12/29/24. It was 253lbs. She's requesting refills of Adipex; Topamax was sent 1/6/25. Last seen in office 11/12/24. Medication pending; Pharmacy verified.

## 2025-01-09 RX ORDER — PHENTERMINE HYDROCHLORIDE 37.5 MG/1
37.5 TABLET ORAL
Qty: 30 TABLET | Refills: 0 | Status: SHIPPED | OUTPATIENT
Start: 2025-01-09 | End: 2025-02-08

## 2025-01-12 DIAGNOSIS — N28.9 KIDNEY DISEASE: Primary | ICD-10-CM

## 2025-01-12 NOTE — TELEPHONE ENCOUNTER
[facility] labs show:  Vit D is slightly low. Take OTC 1,000U daily    Potassium is normalized compared with before.     Kidneys improving but not completely normal yet. Repeat test in 1 month

## 2025-02-12 DIAGNOSIS — R63.8 UNABLE TO LOSE WEIGHT: Primary | ICD-10-CM

## 2025-02-12 RX ORDER — TOPIRAMATE 50 MG/1
50 TABLET, FILM COATED ORAL 2 TIMES DAILY
Qty: 60 TABLET | Refills: 0 | Status: SHIPPED | OUTPATIENT
Start: 2025-02-12

## 2025-02-12 RX ORDER — PHENTERMINE HYDROCHLORIDE 37.5 MG/1
37.5 TABLET ORAL
Qty: 30 TABLET | Refills: 0 | Status: SHIPPED | OUTPATIENT
Start: 2025-02-12 | End: 2025-03-14

## 2025-03-16 ENCOUNTER — PATIENT MESSAGE (OUTPATIENT)
Dept: OBGYN CLINIC | Age: 55
End: 2025-03-16

## 2025-03-16 DIAGNOSIS — E66.811 OBESITY, CLASS I, BMI 30-34.9: ICD-10-CM

## 2025-03-20 RX ORDER — PHENTERMINE HYDROCHLORIDE 37.5 MG/1
37.5 TABLET ORAL
Qty: 30 TABLET | Refills: 0 | Status: SHIPPED | OUTPATIENT
Start: 2025-03-20 | End: 2025-04-19

## 2025-03-20 RX ORDER — TOPIRAMATE 50 MG/1
50 TABLET, FILM COATED ORAL 2 TIMES DAILY
Qty: 60 TABLET | Refills: 0 | Status: SHIPPED | OUTPATIENT
Start: 2025-03-20

## 2025-03-24 ENCOUNTER — OFFICE VISIT (OUTPATIENT)
Dept: OBGYN CLINIC | Age: 55
End: 2025-03-24
Payer: COMMERCIAL

## 2025-03-24 VITALS
DIASTOLIC BLOOD PRESSURE: 70 MMHG | HEART RATE: 76 BPM | WEIGHT: 263 LBS | BODY MASS INDEX: 37.74 KG/M2 | SYSTOLIC BLOOD PRESSURE: 116 MMHG

## 2025-03-24 DIAGNOSIS — N90.7 VULVAR CYST: Primary | ICD-10-CM

## 2025-03-24 PROCEDURE — 99213 OFFICE O/P EST LOW 20 MIN: CPT | Performed by: OBSTETRICS & GYNECOLOGY

## 2025-03-24 RX ORDER — SULFAMETHOXAZOLE AND TRIMETHOPRIM 800; 160 MG/1; MG/1
1 TABLET ORAL 2 TIMES DAILY
Qty: 14 TABLET | Refills: 0 | Status: SHIPPED | OUTPATIENT
Start: 2025-03-24 | End: 2025-03-31

## 2025-03-24 SDOH — ECONOMIC STABILITY: FOOD INSECURITY: WITHIN THE PAST 12 MONTHS, THE FOOD YOU BOUGHT JUST DIDN'T LAST AND YOU DIDN'T HAVE MONEY TO GET MORE.: NEVER TRUE

## 2025-03-24 SDOH — ECONOMIC STABILITY: FOOD INSECURITY: WITHIN THE PAST 12 MONTHS, YOU WORRIED THAT YOUR FOOD WOULD RUN OUT BEFORE YOU GOT MONEY TO BUY MORE.: NEVER TRUE

## 2025-03-24 ASSESSMENT — PATIENT HEALTH QUESTIONNAIRE - PHQ9
SUM OF ALL RESPONSES TO PHQ QUESTIONS 1-9: 0
SUM OF ALL RESPONSES TO PHQ QUESTIONS 1-9: 0
1. LITTLE INTEREST OR PLEASURE IN DOING THINGS: NOT AT ALL
SUM OF ALL RESPONSES TO PHQ QUESTIONS 1-9: 0
2. FEELING DOWN, DEPRESSED OR HOPELESS: NOT AT ALL
SUM OF ALL RESPONSES TO PHQ QUESTIONS 1-9: 0

## 2025-03-25 NOTE — PROGRESS NOTES
Chaperone for Intimate Exam    1. Was chaperone offered as part of the rooming process? offered, accepted   2. If Chaperone is declined by patient, NA: chaperone was available and exam completed  3. Chaperone is Radha rFancis LPN    
for first 2 weeks. And then apply twice weekly thereafter    estradiol (VIVELLE-DOT) 0.0375 MG/24HR 1 patch, TransDERmal, TWICE WEEKLY    hydrocortisone (ANUSOL-HC) 25 mg, Rectal, EVERY 12 HOURS    ibuprofen (ADVIL;MOTRIN) 800 MG tablet Take one tablet every 8 hrs as needed    lidocaine (XYLOCAINE) 5 % ointment Apply topically as needed.    Lubricants (ASTROGLIDE) GEL Apply before intercourse    metoprolol tartrate (LOPRESSOR) 50 mg, Oral, 2 TIMES DAILY    mineral oil light external liquid     Multiple Vitamins-Minerals (WOMENS MULTI VITAMIN & MINERAL PO) 1 tablet, Oral, DAILY    pantoprazole (PROTONIX) 40 mg, Oral, DAILY    phentermine (ADIPEX-P) 37.5 mg, Oral, DAILY BEFORE BREAKFAST    polyethylene glycol (MIRALAX) 17 GM/SCOOP powder Use ONE bottle in AM .    Respiratory Therapy Supplies JAYJAY 1 Device, Does not apply, EVERY 3 HOURS PRN, Please supply CPAP mask and filter    sulfamethoxazole-trimethoprim (BACTRIM DS;SEPTRA DS) 800-160 MG per tablet 1 tablet, Oral, 2 TIMES DAILY    SYNTHROID 137 MCG tablet DAILY    topiramate (TOPAMAX) 50 mg, Oral, 2 TIMES DAILY    topiramate (TOPAMAX) 50 mg, Oral, 2 TIMES DAILY       Patient's medications, allergies, past medical, surgical, social and family histories were reviewed and updated as appropriate.    Review of Systems  As per chief complaint   All other systems reviewed and are negative.    Physical Exam:  Vitals:  /70   Pulse 76   Wt 119.3 kg (263 lb)   LMP 02/12/2023   BMI 37.74 kg/m²   Lungs: CTAB   Heart : Regular S1/S2, no M/R/G  Abdomen: Soft , NT, ND , + BS   Pelvic exam : left labial large retention cyst with possible abscess formation .     Results       Assessment:      Diagnosis Orders   1. Vulvar cyst            Plan:     Assessment & Plan  1. Inclusion cyst on the left labia.  - Initiate antibiotic regimen to reduce inflammation.  - Provide a slip to schedule the excision procedure.  - Perform excision under local anesthesia in the office on

## 2025-04-02 ENCOUNTER — PROCEDURE VISIT (OUTPATIENT)
Dept: OBGYN CLINIC | Age: 55
End: 2025-04-02
Payer: COMMERCIAL

## 2025-04-02 VITALS
WEIGHT: 240 LBS | HEIGHT: 70 IN | HEART RATE: 76 BPM | BODY MASS INDEX: 34.36 KG/M2 | DIASTOLIC BLOOD PRESSURE: 70 MMHG | SYSTOLIC BLOOD PRESSURE: 116 MMHG

## 2025-04-02 DIAGNOSIS — N90.7 VULVAR CYST: Primary | ICD-10-CM

## 2025-04-02 PROCEDURE — 99213 OFFICE O/P EST LOW 20 MIN: CPT | Performed by: OBSTETRICS & GYNECOLOGY

## 2025-04-02 RX ORDER — LEVOTHYROXINE SODIUM 150 UG/1
150 TABLET ORAL DAILY
COMMUNITY
Start: 2025-03-11

## 2025-04-02 RX ORDER — SULFAMETHOXAZOLE AND TRIMETHOPRIM 800; 160 MG/1; MG/1
1 TABLET ORAL 2 TIMES DAILY
Qty: 14 TABLET | Refills: 0 | Status: SHIPPED | OUTPATIENT
Start: 2025-04-02 | End: 2025-04-09

## 2025-04-02 RX ORDER — METRONIDAZOLE 500 MG/1
500 TABLET ORAL 2 TIMES DAILY
Qty: 14 TABLET | Refills: 0 | Status: SHIPPED | OUTPATIENT
Start: 2025-04-02 | End: 2025-04-09

## 2025-04-02 NOTE — PROGRESS NOTES
She states by Thursday, it was starting to grow. On Friday, she was having trouble sitting and walking due to the pain it was causing. She states on Saturday morning, she had to roll out of bed, wasn't able to sit, and it was about the size of a peach pit. Took NSAIDs and a warm bath with some bath salts and after about 20 minutes it came open. She states the whole area was still red but painless. She states it drained very little on Sunday and stopped on Monday. She took her last antibiotic Tuesday night.       Chaperone for Intimate Exam    1. Was chaperone offered as part of the rooming process? offered, accepted   2. If Chaperone is declined by patient, NA: chaperone was available and exam completed  3. Chaperone is Radha Francis LPN

## 2025-04-09 NOTE — PROGRESS NOTES
Isabella Lara is a 54 y.o. female who presents here today for complaints of Procedure (Excision of left labial inclusion cyst. )    Cyst drained at home and resolved         Vitals:  /70 (BP Site: Left Upper Arm, Patient Position: Sitting, BP Cuff Size: Large Adult)   Pulse 76   Ht 1.778 m (5' 10\")   Wt 108.9 kg (240 lb)   LMP 2023   BMI 34.44 kg/m²   Allergies:  Penicillin g, Penicillins, Codeine, Meperidine, and Demerol  Past Medical History:   Diagnosis Date    Abnormal glandular Papanicolaou smear of cervix 2023    Hypothyroidism     Secondary hypertension 2021    Sleep apnea 2016    dr Hu, mild, c pap    Unspecified vitamin D deficiency      Past Surgical History:   Procedure Laterality Date    APPENDECTOMY      CARPAL TUNNEL RELEASE Right     CHOLECYSTECTOMY      DILATION AND CURETTAGE OF UTERUS      HYSTERECTOMY (CERVIX STATUS UNKNOWN)  2023    partial    HYSTERECTOMY, VAGINAL N/A 2023    TOTAL LAPAROSCOPIC  HYSTERECTOMY, BILATERAL SALPINGECTOMY performed [place]ccarty [place] [facility] [place]    SHOULDER SURGERY Right     THUMB ARTHROSCOPY Left     WISDOM TOOTH EXTRACTION       OB History          1    Para        Term   0            AB   1    Living   0         SAB   1    IAB        Ectopic        Molar        Multiple        Live Births   0              Family History   Problem Relation Age of Onset    Cirrhosis Mother     Diabetes Mother     High Blood Pressure Father     Heart Disease Father     Breast Cancer Maternal Aunt 59        does not know laterality     Social History     Socioeconomic History    Marital status:      Spouse name: Not on file    Number of children: Not on file    Years of education: Not on file    Highest education level: Not on file   Occupational History    Not on file   Tobacco Use    Smoking status: Never    Smokeless tobacco: Never   Vaping Use    Vaping status: Never Used   Substance and Sexual Activity

## 2025-05-01 DIAGNOSIS — E66.811 OBESITY, CLASS I, BMI 30-34.9: ICD-10-CM

## 2025-05-01 NOTE — TELEPHONE ENCOUNTER
Patient requesting refill of Adipex. Current weight reported by patient is 240. Last seen in office 4/2/25 for vulvar cyst. Medications pending; Pharmacy verified.

## 2025-05-02 RX ORDER — TOPIRAMATE 50 MG/1
50 TABLET, FILM COATED ORAL 2 TIMES DAILY
Qty: 60 TABLET | Refills: 0 | Status: SHIPPED | OUTPATIENT
Start: 2025-05-02

## 2025-05-02 RX ORDER — PHENTERMINE HYDROCHLORIDE 37.5 MG/1
37.5 TABLET ORAL
Qty: 30 TABLET | Refills: 0 | Status: SHIPPED | OUTPATIENT
Start: 2025-05-02 | End: 2025-06-01

## 2025-05-05 ENCOUNTER — OFFICE VISIT (OUTPATIENT)
Dept: PRIMARY CARE CLINIC | Age: 55
End: 2025-05-05
Payer: COMMERCIAL

## 2025-05-05 VITALS
TEMPERATURE: 98.5 F | RESPIRATION RATE: 18 BRPM | HEART RATE: 67 BPM | OXYGEN SATURATION: 99 % | BODY MASS INDEX: 34.44 KG/M2 | SYSTOLIC BLOOD PRESSURE: 134 MMHG | WEIGHT: 240 LBS | DIASTOLIC BLOOD PRESSURE: 82 MMHG

## 2025-05-05 DIAGNOSIS — I15.9 SECONDARY HYPERTENSION: ICD-10-CM

## 2025-05-05 DIAGNOSIS — E03.9 HYPOTHYROIDISM, UNSPECIFIED TYPE: ICD-10-CM

## 2025-05-05 DIAGNOSIS — N28.9 KIDNEY DISEASE: ICD-10-CM

## 2025-05-05 DIAGNOSIS — F43.22 ADJUSTMENT DISORDER WITH ANXIOUS MOOD: Primary | ICD-10-CM

## 2025-05-05 PROCEDURE — 99214 OFFICE O/P EST MOD 30 MIN: CPT | Performed by: INTERNAL MEDICINE

## 2025-05-05 RX ORDER — BUSPIRONE HYDROCHLORIDE 5 MG/1
5 TABLET ORAL 3 TIMES DAILY PRN
Qty: 30 TABLET | Refills: 1 | Status: SHIPPED | OUTPATIENT
Start: 2025-05-05

## 2025-05-05 SDOH — ECONOMIC STABILITY: FOOD INSECURITY: WITHIN THE PAST 12 MONTHS, YOU WORRIED THAT YOUR FOOD WOULD RUN OUT BEFORE YOU GOT MONEY TO BUY MORE.: NEVER TRUE

## 2025-05-05 SDOH — ECONOMIC STABILITY: FOOD INSECURITY: WITHIN THE PAST 12 MONTHS, THE FOOD YOU BOUGHT JUST DIDN'T LAST AND YOU DIDN'T HAVE MONEY TO GET MORE.: NEVER TRUE

## 2025-05-05 ASSESSMENT — PATIENT HEALTH QUESTIONNAIRE - PHQ9
SUM OF ALL RESPONSES TO PHQ QUESTIONS 1-9: 0
SUM OF ALL RESPONSES TO PHQ QUESTIONS 1-9: 0
1. LITTLE INTEREST OR PLEASURE IN DOING THINGS: NOT AT ALL
SUM OF ALL RESPONSES TO PHQ QUESTIONS 1-9: 0
SUM OF ALL RESPONSES TO PHQ QUESTIONS 1-9: 0
2. FEELING DOWN, DEPRESSED OR HOPELESS: NOT AT ALL

## 2025-05-05 NOTE — PROGRESS NOTES
Subjective:      Patient ID: Isabella Lara is a 54 y.o. female    Follow up   HPI  Pt presents for follow up regarding treatment of hypertension. Controlled on amlodipine and metoprolol.     Complains of intense personal life stressors(father with ailing health and job insecurity). Sad but not depressed. Attests to adequate sleep. Denies SI.    Hemoglobin A1C (%)   Date Value   04/13/2021 5.4     Currently on Adipex-wt loss achieved.    5/6/2024 6/11/2024 7/8/2024 8/14/2024   Vitals       Weight - Scale 285 lb  288 lb  282 lb  277 lb    Height 5' 10\"  5' 10\"  5' 10\"  5' 10\"    Body Mass Index 40.89 kg/m2 (H)  41.32 kg/m2 (H)  40.46 kg/m2 (H)  39.75 kg/m2 (H)       9/18/2024 10/16/2024 10/25/2024 11/12/2024   Vitals       Weight - Scale 268 lb  265 lb  265 lb 12.8 oz  263 lb    Height 5' 10\"   5' 10\"  5' 10\"    Body Mass Index 38.45 kg/m2 (H)   38.14 kg/m2 (H)  37.74 kg/m2 (H)       3/24/2025 4/2/2025 5/5/2025   Vitals      Weight - Scale 263 lb  240 lb  240 lb    Height  5' 10\"     Body Mass Index  34.44 kg/m2 (H)        MELINA improved, per [facility] result.     Requests evaluation of left labia minora \"hole\" since persistent labia cyst s/p drainage and antibiotic use.      Past Medical History:   Diagnosis Date    Abnormal glandular Papanicolaou smear of cervix 01/09/2023    Hypothyroidism     Secondary hypertension 04/13/2021    Sleep apnea 03/2016    dr Hu, mild, c pap    Unspecified vitamin D deficiency      Past Surgical History:   Procedure Laterality Date    APPENDECTOMY      CARPAL TUNNEL RELEASE Right     CHOLECYSTECTOMY      DILATION AND CURETTAGE OF UTERUS      HYSTERECTOMY (CERVIX STATUS UNKNOWN)  03/2023    partial    HYSTERECTOMY, VAGINAL N/A 03/02/2023    TOTAL LAPAROSCOPIC  HYSTERECTOMY, BILATERAL SALPINGECTOMY performed by Jennifer Mccarty MD at [facility] [place]    SHOULDER SURGERY Right     THUMB ARTHROSCOPY Left     WISDOM TOOTH EXTRACTION       Social History     Socioeconomic History    Marital status:

## 2025-05-20 ENCOUNTER — OFFICE VISIT (OUTPATIENT)
Dept: PRIMARY CARE CLINIC | Age: 55
End: 2025-05-20
Payer: COMMERCIAL

## 2025-05-20 ENCOUNTER — PATIENT MESSAGE (OUTPATIENT)
Dept: OBGYN CLINIC | Age: 55
End: 2025-05-20

## 2025-05-20 VITALS
BODY MASS INDEX: 34.61 KG/M2 | DIASTOLIC BLOOD PRESSURE: 84 MMHG | WEIGHT: 241.2 LBS | OXYGEN SATURATION: 100 % | SYSTOLIC BLOOD PRESSURE: 120 MMHG | HEART RATE: 64 BPM

## 2025-05-20 DIAGNOSIS — F43.22 ADJUSTMENT DISORDER WITH ANXIOUS MOOD: Primary | ICD-10-CM

## 2025-05-20 DIAGNOSIS — Z12.11 SPECIAL SCREENING FOR MALIGNANT NEOPLASMS, COLON: ICD-10-CM

## 2025-05-20 DIAGNOSIS — E66.811 OBESITY, CLASS I, BMI 30-34.9: ICD-10-CM

## 2025-05-20 PROCEDURE — 99213 OFFICE O/P EST LOW 20 MIN: CPT | Performed by: INTERNAL MEDICINE

## 2025-05-20 RX ORDER — PHENTERMINE HYDROCHLORIDE 37.5 MG/1
37.5 TABLET ORAL
Qty: 30 TABLET | Refills: 0 | Status: SHIPPED | OUTPATIENT
Start: 2025-05-20 | End: 2025-06-19

## 2025-05-20 NOTE — TELEPHONE ENCOUNTER
Adipex sent to pharmacy 5/2/25. She's been unable to get that filled there and is [facility] [place]. Medication pending; Pharmacy verified.

## 2025-06-02 ENCOUNTER — OFFICE VISIT (OUTPATIENT)
Dept: OBGYN CLINIC | Age: 55
End: 2025-06-02
Payer: COMMERCIAL

## 2025-06-02 VITALS
DIASTOLIC BLOOD PRESSURE: 84 MMHG | BODY MASS INDEX: 34.07 KG/M2 | WEIGHT: 238 LBS | HEIGHT: 70 IN | HEART RATE: 72 BPM | SYSTOLIC BLOOD PRESSURE: 132 MMHG

## 2025-06-02 DIAGNOSIS — Z01.419 ENCOUNTER FOR ANNUAL ROUTINE GYNECOLOGICAL EXAMINATION: Primary | ICD-10-CM

## 2025-06-02 PROCEDURE — 99396 PREV VISIT EST AGE 40-64: CPT | Performed by: OBSTETRICS & GYNECOLOGY

## 2025-06-02 NOTE — PROGRESS NOTES
cervix 2023    Endometriosis diagnosed 3/2023 after hysterectomy    Hypothyroidism     Menopausal symptoms Aug - Oct 2023    has a few hot flashes then nothing since    Secondary hypertension 2021    Sleep apnea 2016    dr Hu, mild, c pap    Unspecified vitamin D deficiency      Past Surgical History:   Procedure Laterality Date    APPENDECTOMY      CARPAL TUNNEL RELEASE Right     CHOLECYSTECTOMY      DILATION AND CURETTAGE      Miscarriage    DILATION AND CURETTAGE OF UTERUS      HYSTERECTOMY (CERVIX STATUS UNKNOWN)  2023    partial    HYSTERECTOMY, VAGINAL N/A 2023    TOTAL LAPAROSCOPIC  HYSTERECTOMY, BILATERAL SALPINGECTOMY [place] [facility] [place]    SHOULDER SURGERY Right     THUMB ARTHROSCOPY Left     WISDOM TOOTH EXTRACTION       Family History   Problem Relation Age of Onset    Cirrhosis Mother     Diabetes Mother     High Blood Pressure Father     Heart Disease Father     Diabetes Father     Mental Illness Father         Depression    Stroke Father         2025    Breast Cancer Maternal Aunt 59        does not know laterality    Hemophilia Maternal Grandmother         Her first son  from this at age ~7. I also had a 2nd cousin with this illness lived until 17 with it    Stroke Paternal Grandmother     Diabetes Sister      Social History     Socioeconomic History    Marital status:      Spouse name: Not on file    Number of children: Not on file    Years of education: Not on file    Highest education level: Not on file   Occupational History    Not on file   Tobacco Use    Smoking status: Never    Smokeless tobacco: Never   Vaping Use    Vaping status: Never Used   Substance and Sexual Activity    Alcohol use: Never     Comment: occasional/rare    Drug use: No    Sexual activity: Yes     Partners: Male     Comment: vasectomy   Other Topics Concern    Not on file   Social History Narrative    Not on file     Social Drivers of Health     Financial

## 2025-06-18 DIAGNOSIS — Z12.31 VISIT FOR SCREENING MAMMOGRAM: Primary | ICD-10-CM

## 2025-07-09 RX ORDER — TOPIRAMATE 50 MG/1
50 TABLET, FILM COATED ORAL 2 TIMES DAILY
Qty: 180 TABLET | Refills: 1 | Status: SHIPPED | OUTPATIENT
Start: 2025-07-09

## 2025-07-23 ENCOUNTER — HOSPITAL ENCOUNTER (OUTPATIENT)
Dept: WOMENS IMAGING | Age: 55
Discharge: HOME | End: 2025-07-25
Payer: COMMERCIAL

## 2025-07-23 DIAGNOSIS — Z12.31 VISIT FOR SCREENING MAMMOGRAM: ICD-10-CM

## 2025-07-23 PROCEDURE — 77063 BREAST TOMOSYNTHESIS BI: CPT

## 2025-08-15 ENCOUNTER — TELEPHONE (OUTPATIENT)
Dept: OBGYN CLINIC | Age: 55
End: 2025-08-15

## 2025-08-15 RX ORDER — ESTRADIOL 0.04 MG/D
1 PATCH, EXTENDED RELEASE TRANSDERMAL
Qty: 24 PATCH | Refills: 1 | Status: SHIPPED | OUTPATIENT
Start: 2025-08-18

## (undated) DEVICE — DISSECTOR LAP DIA5MM BLNT TIP ENDOPATH

## (undated) DEVICE — LINER INCONT W7XL14IN PEACH POLYMER FLUF ADH WT CNTOUR DLX

## (undated) DEVICE — INTENDED FOR TISSUE SEPARATION, AND OTHER PROCEDURES THAT REQUIRE A SHARP SURGICAL BLADE TO PUNCTURE OR CUT.: Brand: BARD-PARKER ® CARBON RIB-BACK BLADES

## (undated) DEVICE — GLOVE ORANGE PI 8 1/2   MSG9085

## (undated) DEVICE — COVER LT HNDL BLU PLAS

## (undated) DEVICE — ADHESIVE SKIN CLSR 0.7ML TOP DERMBND ADV

## (undated) DEVICE — TOWEL,OR,DSP,ST,BLUE,STD,4/PK,20PK/CS: Brand: MEDLINE

## (undated) DEVICE — ELECTRODE PT RET AD L9FT HI MOIST COND ADH HYDRGEL CORDED

## (undated) DEVICE — SHEET BD STD REPOS LO FICT BTM SFT TOP NO STRP 9 HNDL PER

## (undated) DEVICE — SUTURE VCRL SZ 0 L36IN ABSRB UD L36MM CT-1 1/2 CIR J946H

## (undated) DEVICE — PAD BD FOAM 33X72X2.75 IN BLU EGGCRATE

## (undated) DEVICE — KIT,ANTI FOG,W/SPONGE & FLUID,SOFT PACK: Brand: MEDLINE

## (undated) DEVICE — GAUZE,SPONGE,4"X4",16PLY,XRAY,STRL,LF: Brand: MEDLINE

## (undated) DEVICE — LABEL MED MINI W/ MARKER

## (undated) DEVICE — COUNTER NDL 40 COUNT HLD 70 FOAM BLK ADH W/ MAG

## (undated) DEVICE — SKIN PREP TRAY 4 COMPARTM TRAY: Brand: MEDLINE INDUSTRIES, INC.

## (undated) DEVICE — APPLICATOR MEDICATED 26 CC SOLUTION HI LT ORNG CHLORAPREP

## (undated) DEVICE — TROCAR: Brand: KII FIOS FIRST ENTRY

## (undated) DEVICE — 4-PORT MANIFOLD: Brand: NEPTUNE 2

## (undated) DEVICE — SYRINGE MED 10ML TRNSLUC BRL PLUNG BLK MRK POLYPR CTRL

## (undated) DEVICE — NEEDLE INSUF L120MM ULT VERES ENDOPATH

## (undated) DEVICE — GOWN,AURORA,NONREINFORCED,LARGE: Brand: MEDLINE

## (undated) DEVICE — DEVICE TRCR 12X9X3IN WHT CLSR DISP OMNICLOSE

## (undated) DEVICE — POUCH, INSTRUMENT, 3POCKET, INVISISHIELD: Brand: MEDLINE

## (undated) DEVICE — GLOVE SURG SZ 9 THK91MIL LTX FREE SYN POLYISOPRENE ANTI

## (undated) DEVICE — TUBING INSUFFLATION SMK EVAC HI FLO SET PNEUMOCLEAR

## (undated) DEVICE — Device

## (undated) DEVICE — VCARE SMALL, UTERINE MANIPULATOR, VAGINAL-CERVICAL-AHLUWALIA'S-RETRACTOR-ELEVATOR: Brand: VCARE

## (undated) DEVICE — PACK,BASIC: Brand: MEDLINE

## (undated) DEVICE — SUTURE MCRYL SZ 4-0 L27IN ABSRB UD L19MM PS-2 1/2 CIR PRIM Y426H

## (undated) DEVICE — COVER,TABLE,44X90,STERILE: Brand: MEDLINE

## (undated) DEVICE — SHEARS ENDOSCP HARM 36CM ULTRASONIC CRV TIP UPGRD

## (undated) DEVICE — SUTURE POLYESTER STRATAFIX V-34 SXPP1B457

## (undated) DEVICE — GOWN,AURORA,NONRNF,XL,30/CS: Brand: MEDLINE

## (undated) DEVICE — TOTAL TRAY, DB, 100% SILI FOLEY, 16FR 10: Brand: MEDLINE

## (undated) DEVICE — WARMER SCP 2 ANTIFOG LAP DISP

## (undated) DEVICE — TROCAR: Brand: KII® SLEEVE

## (undated) DEVICE — DRAPE, LAVH, STERILE: Brand: MEDLINE

## (undated) DEVICE — SPONGE,LAP,18"X18",DLX,XR,ST,5/PK,40/PK: Brand: MEDLINE